# Patient Record
Sex: MALE | Race: WHITE
[De-identification: names, ages, dates, MRNs, and addresses within clinical notes are randomized per-mention and may not be internally consistent; named-entity substitution may affect disease eponyms.]

---

## 2017-04-29 ENCOUNTER — HOSPITAL ENCOUNTER (OUTPATIENT)
Dept: HOSPITAL 17 - NEPE | Age: 57
Setting detail: OBSERVATION
LOS: 3 days | Discharge: HOME | End: 2017-05-02
Attending: HOSPITALIST | Admitting: HOSPITALIST
Payer: OTHER GOVERNMENT

## 2017-04-29 VITALS — OXYGEN SATURATION: 97 % | RESPIRATION RATE: 16 BRPM

## 2017-04-29 VITALS — BODY MASS INDEX: 28.73 KG/M2 | WEIGHT: 189.6 LBS | HEIGHT: 68 IN

## 2017-04-29 VITALS — HEART RATE: 57 BPM

## 2017-04-29 VITALS
HEART RATE: 52 BPM | RESPIRATION RATE: 18 BRPM | SYSTOLIC BLOOD PRESSURE: 135 MMHG | DIASTOLIC BLOOD PRESSURE: 78 MMHG | OXYGEN SATURATION: 98 %

## 2017-04-29 VITALS
OXYGEN SATURATION: 100 % | HEART RATE: 86 BPM | RESPIRATION RATE: 15 BRPM | SYSTOLIC BLOOD PRESSURE: 120 MMHG | DIASTOLIC BLOOD PRESSURE: 69 MMHG | TEMPERATURE: 97.4 F

## 2017-04-29 VITALS
DIASTOLIC BLOOD PRESSURE: 77 MMHG | HEART RATE: 61 BPM | SYSTOLIC BLOOD PRESSURE: 145 MMHG | OXYGEN SATURATION: 97 % | RESPIRATION RATE: 18 BRPM

## 2017-04-29 VITALS — SYSTOLIC BLOOD PRESSURE: 136 MMHG | DIASTOLIC BLOOD PRESSURE: 72 MMHG

## 2017-04-29 DIAGNOSIS — R00.1: ICD-10-CM

## 2017-04-29 DIAGNOSIS — Z23: ICD-10-CM

## 2017-04-29 DIAGNOSIS — F32.9: ICD-10-CM

## 2017-04-29 DIAGNOSIS — E86.0: ICD-10-CM
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
## 2017-04-29 DIAGNOSIS — R55: Primary | ICD-10-CM

## 2017-04-29 DIAGNOSIS — F41.9: ICD-10-CM

## 2017-04-29 DIAGNOSIS — L40.9: ICD-10-CM

## 2017-04-29 DIAGNOSIS — G40.909: ICD-10-CM

## 2017-04-29 DIAGNOSIS — Z86.73: ICD-10-CM

## 2017-04-29 DIAGNOSIS — G43.909: ICD-10-CM

## 2017-04-29 DIAGNOSIS — S09.90XA: ICD-10-CM

## 2017-04-29 DIAGNOSIS — R94.31: ICD-10-CM

## 2017-04-29 LAB
ALP SERPL-CCNC: 49 U/L (ref 45–117)
ALT SERPL-CCNC: 31 U/L (ref 12–78)
ANION GAP SERPL CALC-SCNC: 8 MEQ/L (ref 5–15)
APTT BLD: 27.1 SEC (ref 24.3–30.1)
AST SERPL-CCNC: 20 U/L (ref 15–37)
BASOPHILS # BLD AUTO: 0 TH/MM3 (ref 0–0.2)
BASOPHILS NFR BLD: 0.4 % (ref 0–2)
BILIRUB SERPL-MCNC: 0.5 MG/DL (ref 0.2–1)
BUN SERPL-MCNC: 22 MG/DL (ref 7–18)
CHLORIDE SERPL-SCNC: 104 MEQ/L (ref 98–107)
CK MB SERPL-MCNC: 2.8 NG/ML (ref 0.5–3.6)
CK SERPL-CCNC: 230 U/L (ref 39–308)
EOSINOPHIL # BLD: 0 TH/MM3 (ref 0–0.4)
EOSINOPHIL NFR BLD: 0.4 % (ref 0–4)
ERYTHROCYTE [DISTWIDTH] IN BLOOD BY AUTOMATED COUNT: 14.7 % (ref 11.6–17.2)
GFR SERPLBLD BASED ON 1.73 SQ M-ARVRAT: 85 ML/MIN (ref 89–?)
HCO3 BLD-SCNC: 29.4 MEQ/L (ref 21–32)
HCT VFR BLD CALC: 39.5 % (ref 39–51)
HEMO FLAGS: (no result)
INR PPP: 1 RATIO
LYMPHOCYTES # BLD AUTO: 1.5 TH/MM3 (ref 1–4.8)
LYMPHOCYTES NFR BLD AUTO: 15.5 % (ref 9–44)
MCH RBC QN AUTO: 29.7 PG (ref 27–34)
MCHC RBC AUTO-ENTMCNC: 33.9 % (ref 32–36)
MCV RBC AUTO: 87.7 FL (ref 80–100)
MONOCYTES NFR BLD: 9.4 % (ref 0–8)
NEUTROPHILS # BLD AUTO: 7.1 TH/MM3 (ref 1.8–7.7)
NEUTROPHILS NFR BLD AUTO: 74.3 % (ref 16–70)
PLATELET # BLD: 180 TH/MM3 (ref 150–450)
POTASSIUM SERPL-SCNC: 3.9 MEQ/L (ref 3.5–5.1)
PROTHROMBIN TIME: 10.7 SEC (ref 9.8–11.6)
RBC # BLD AUTO: 4.51 MIL/MM3 (ref 4.5–5.9)
SODIUM SERPL-SCNC: 141 MEQ/L (ref 136–145)
WBC # BLD AUTO: 9.6 TH/MM3 (ref 4–11)

## 2017-04-29 PROCEDURE — 80053 COMPREHEN METABOLIC PANEL: CPT

## 2017-04-29 PROCEDURE — 90732 PPSV23 VACC 2 YRS+ SUBQ/IM: CPT

## 2017-04-29 PROCEDURE — 72125 CT NECK SPINE W/O DYE: CPT

## 2017-04-29 PROCEDURE — 85025 COMPLETE CBC W/AUTO DIFF WBC: CPT

## 2017-04-29 PROCEDURE — 80164 ASSAY DIPROPYLACETIC ACD TOT: CPT

## 2017-04-29 PROCEDURE — 93306 TTE W/DOPPLER COMPLETE: CPT

## 2017-04-29 PROCEDURE — 83880 ASSAY OF NATRIURETIC PEPTIDE: CPT

## 2017-04-29 PROCEDURE — 93880 EXTRACRANIAL BILAT STUDY: CPT

## 2017-04-29 PROCEDURE — 85730 THROMBOPLASTIN TIME PARTIAL: CPT

## 2017-04-29 PROCEDURE — 95819 EEG AWAKE AND ASLEEP: CPT

## 2017-04-29 PROCEDURE — 99285 EMERGENCY DEPT VISIT HI MDM: CPT

## 2017-04-29 PROCEDURE — 82552 ASSAY OF CPK IN BLOOD: CPT

## 2017-04-29 PROCEDURE — 84484 ASSAY OF TROPONIN QUANT: CPT

## 2017-04-29 PROCEDURE — 85610 PROTHROMBIN TIME: CPT

## 2017-04-29 PROCEDURE — 97162 PT EVAL MOD COMPLEX 30 MIN: CPT

## 2017-04-29 PROCEDURE — G0378 HOSPITAL OBSERVATION PER HR: HCPCS

## 2017-04-29 PROCEDURE — 93005 ELECTROCARDIOGRAM TRACING: CPT

## 2017-04-29 PROCEDURE — 70450 CT HEAD/BRAIN W/O DYE: CPT

## 2017-04-29 PROCEDURE — 96374 THER/PROPH/DIAG INJ IV PUSH: CPT

## 2017-04-29 PROCEDURE — 81001 URINALYSIS AUTO W/SCOPE: CPT

## 2017-04-29 PROCEDURE — 82550 ASSAY OF CK (CPK): CPT

## 2017-04-29 PROCEDURE — 71010: CPT

## 2017-04-29 RX ADMIN — QUETIAPINE SCH MG: 150 TABLET, EXTENDED RELEASE ORAL at 23:01

## 2017-04-29 RX ADMIN — Medication SCH ML: at 23:00

## 2017-04-29 RX ADMIN — MORPHINE SULFATE PRN MG: 2 INJECTION, SOLUTION INTRAMUSCULAR; INTRAVENOUS at 20:23

## 2017-04-29 RX ADMIN — BETAMETHASONE DIPROPIONATE SCH APPLIC: 0.5 CREAM TOPICAL at 23:02

## 2017-04-29 RX ADMIN — PHENYTOIN SODIUM SCH MLS/HR: 50 INJECTION INTRAMUSCULAR; INTRAVENOUS at 20:23

## 2017-04-29 NOTE — RADRPT
EXAM DATE/TIME:  04/29/2017 16:32 

 

[facility] COMPARISON:     

No previous studies available for comparison.

 

                     

INDICATIONS :     

Dizzy and lightheaded.

                     

 

MEDICAL HISTORY :     

None.          

 

SURGICAL HISTORY :     

None.   

 

ENCOUNTER:     

Initial                                        

 

ACUITY:     

1 day      

 

PAIN SCORE:     

0/10

 

LOCATION:     

Bilateral chest 

 

FINDINGS:     

The lungs are clear without infiltrate, nodule, or mass except for slight left lung base atelectasis.
  There is no appreciable pleural effusion for technique.  Heart and mediastinum are unremarkable.

 

CONCLUSION:         

Slight left lung base atelectasis.

 

 

 

 GAYLE Garcia MD on April 29, 2017 at 17:27           

Board Certified Radiologist.

 This report was verified electronically.

## 2017-04-29 NOTE — PD
HPI


Chief Complaint:  Syncope/Near-Syncope


Time Seen by Provider:  16:33


Travel History


International Travel<30 days:  No


Contact w/Intl Traveler<30days:  No


Traveled to known affect area:  No





History of Present Illness


HPI


57-year-old male with history of seizure disorder, migraine headaches, CVA, 

presents to the emergency department for evaluation.  Patient states that this 

morning he began feeling dizzy.  He was with a friend.  He became lightheaded 

and passed out.  He struck his head on the ground.  Patient states following 

that he has felt not quite himself throughout the day.  He states that there 

were 2 more instance today where he fell again.  He states he was aware but had 

felt like he had lost control of his body.  He states that this scared him.  He 

has never had anything like this happen before.  He does sometimes fall from 

time to time but he states never like this.  He denies any recent illnesses, 

fever, or chills.  Denies any chest pain or tightness.  Denies any episodes of 

diaphoresis.  No nausea or vomiting.  He is reporting a headache and back pain 

now since striking his head on the ground.  He reports no acute focal deficit 

or weakness.  He has no other symptoms to report at this time.





PFSH


Past Medical History


Arthritis:  Yes


Anxiety:  Yes


Depression:  Yes


Cerebrovascular Accident:  Yes


Diabetes:  No


Musculoskeletal:  Yes (DEGENERATIVE DISC DISEASE, BULGING DISC)


Integumentary:  Yes (PSORIASIS)


Immunizations Current:  Yes





Past Surgical History


Abdominal Surgery:  Yes (HERNIA REPAIR RIGHT LOWER GROIN)


Eye Surgery:  Yes (left eyelid)


Other Surgery:  Yes (SINUS)





Social History


Alcohol Use:  No (3 years ago)


Tobacco Use:  No


Substance Use:  No





Allergies-Medications


(Allergen,Severity, Reaction):  


Coded Allergies:  


     Lomustine (Verified  Allergy, Severe, Rash, 4/29/17)


     Penicillin (Verified  Allergy, Severe, Rash, 4/29/17)


Reported Meds & Prescriptions





Reported Meds & Active Scripts


Active


Reported


Ammonium Lactate (Lactic Acid) 12 % Cre 1 Applic TOP BID


     


     


     APPLY TO:


Clobetasol Topical (Clobetasol Propionate) 0.05% Cream 1 Applic TOPICAL BID


Pyridostigmine ER (Pyridostigmine Bromide) 180 Mg Tab 60 Mg PO TID


Trazodone HCl 100 Mg Tab 200 Mg PO HS


Fluoxetine HCl (Fluoxetine HCl (Pmdd)) 20 Mg Tab 60 Mg PO DAILY


Quetiapine Fumarate ER (Quetiapine Fumarate) 200 Mg Tab 150 Mg PO HS


Hydroxyzine Pamoate 100 Mg Cap 100 Mg PO QID PRN


Divalproex ER (Divalproex Sodium) 500 Mg Tab 1,000 Mg PO DAILY


Zolpidem (Zolpidem Tartrate) 5 Mg Tab 5 Mg PO HS PRN








Review of Systems


Except as stated in HPI:  all other systems reviewed are Neg





Physical Exam


Narrative


GENERAL: Well-nourished male patient, sitting in bed in no acute distress


SKIN: Focused skin assessment warm/dry.


HEAD: Normocephalic. 


EYES: Slightly [place] left pupil.  No scleral icterus. No injection or 

drainage. 


ENT: No nasal bleeding or discharge.  Mucous membranes pink and moist.


NECK: Trachea midline. No JVD.  No cervical spine tenderness palpation.


CARDIOVASCULAR: Regular rate and rhythm.  No murmur appreciated.


RESPIRATORY: No accessory muscle use. Clear to auscultation. Breath sounds 

equal bilaterally. 


GASTROINTESTINAL: Abdomen soft, non-tender, nondistended. Hepatic and splenic 

margins not palpable. 


MUSCULOSKELETAL: No obvious deformities. No clubbing.  No cyanosis.  No edema. 


NEUROLOGICAL: Awake and alert. No obvious cranial nerve deficits.  Motor 

grossly within normal limits. Normal speech.


PSYCHIATRIC: Appropriate mood and affect; insight and judgment normal.





Data


Data


Last Documented VS





Vital Signs








  Date Time  Temp Pulse Resp B/P Pulse Ox O2 Delivery O2 Flow Rate FiO2


 


4/29/17 16:38   16  97 Room Air  


 


4/29/17 16:07 97.4 86  120/69    








Orders





 Electrocardiogram (4/29/17 16:24)


Complete Blood Count With Diff (4/29/17 16:24)


Comprehensive Metabolic Panel (4/29/17 16:24)


B-Type Natriuretic Peptide (4/29/17 16:24)


Ckmb (Isoenzyme) Profile (4/29/17 16:24)


Troponin I (4/29/17 16:24)


Act Partial Throm Time (Ptt) (4/29/17 16:24)


Prothrombin Time / Inr (Pt) (4/29/17 16:24)


Urinalysis - C+S If Indicated (4/29/17 16:24)


Chest, Single Ap (4/29/17 16:24)


Ct Brain W/O Iv Contrast(Rout) (4/29/17 16:24)


Ct Cerv Spine W/O Contrast (4/29/17 16:24)


Ecg Monitoring (4/29/17 16:24)


Iv Access Insert/Monitor (4/29/17 16:24)


Oximetry (4/29/17 16:24)


Sodium Chloride 0.9% Flush (Ns Flush) (4/29/17 16:30)


CKMB (4/29/17 16:58)


CKMB% (4/29/17 16:58)


Sodium Chlor 0.9% 1000 Ml Inj (Ns 1000 M (4/29/17 18:15)


Orthostatic Blood Pressure (4/29/17 18:14)


Valproic Acid (Depakene) (4/29/17 19:07)





Labs








 Laboratory Tests








Test 4/29/17





 16:58


 


White Blood Count 9.6 TH/MM3


 


Red Blood Count 4.51 MIL/MM3


 


Hemoglobin 13.4 GM/DL


 


Hematocrit 39.5 %


 


Mean Corpuscular Volume 87.7 FL


 


Mean Corpuscular Hemoglobin 29.7 PG


 


Mean Corpuscular Hemoglobin 33.9 %





Concent 


 


Red Cell Distribution Width 14.7 %


 


Platelet Count 180 TH/MM3


 


Mean Platelet Volume 7.1 FL


 


Neutrophils (%) (Auto) 74.3 %


 


Lymphocytes (%) (Auto) 15.5 %


 


Monocytes (%) (Auto) 9.4 %


 


Eosinophils (%) (Auto) 0.4 %


 


Basophils (%) (Auto) 0.4 %


 


Neutrophils # (Auto) 7.1 TH/MM3


 


Lymphocytes # (Auto) 1.5 TH/MM3


 


Monocytes # (Auto) 0.9 TH/MM3


 


Eosinophils # (Auto) 0.0 TH/MM3


 


Basophils # (Auto) 0.0 TH/MM3


 


CBC Comment DIFF FINAL 


 


Differential Comment  


 


Prothrombin Time 10.7 SEC


 


Prothromb Time International 1.0 RATIO





Ratio 


 


Activated Partial 27.1 SEC





Thromboplast Time 


 


Sodium Level 141 MEQ/L


 


Potassium Level 3.9 MEQ/L


 


Chloride Level 104 MEQ/L


 


Carbon Dioxide Level 29.4 MEQ/L


 


Anion Gap 8 MEQ/L


 


Blood Urea Nitrogen 22 MG/DL


 


Creatinine 0.92 MG/DL


 


Estimat Glomerular Filtration 85 ML/MIN





Rate 


 


Random Glucose 90 MG/DL


 


Calcium Level 8.8 MG/DL


 


Total Bilirubin 0.5 MG/DL


 


Aspartate Amino Transf 20 U/L





(AST/SGOT) 


 


Alanine Aminotransferase 31 U/L





(ALT/SGPT) 


 


Alkaline Phosphatase 49 U/L


 


Total Creatine Kinase 230 U/L


 


Creatine Kinase MB 2.8 NG/ML


 


Troponin I LESS THAN 0.02





 NG/ML


 


B-Type Natriuretic Peptide 63 PG/ML


 


Total Protein 7.2 GM/DL


 


Albumin 3.8 GM/DL














MDM


Medical Decision Making


Medical Screen Exam Complete:  Yes


Emergency Medical Condition:  Yes


Medical Record Reviewed:  Yes


Differential Diagnosis


Syncope versus near-syncope versus electrolyte abnormality versus dehydration 

versus cardiac etiology versus neurologic etiology versus seizure versus 

Depakote toxicity


Narrative Course


57-year-old male presents to emergency department for evaluation following 3 

syncopal episodes today.  Patient appears without distress.  His vital signs 

are stable.  Neuro exam is nonfocal.  CBC and CMP are without acute concern.  

Troponin is less than 0.02.  BNP is 63.  CT of the cervical spine is a slight 

neural foramina compromise at the right C3-C4, left C5-C6, left C6-C7, and 

lateral recess compromise at C6 7.  CT imaging of the brain is without acute 

intracranial normality.  Chest x-ray shows slight left lung base atelectasis.  

I discussed the patient my attending physician Dr. Quezada who agrees the patient 

should be admitted observation for syncope workup.  This is discussed with the 

patient.  He is in agreement with this clinic care.





Diagnosis





 Primary Impression:  


 Syncope


 Qualified Code:  R55 - Syncope, unspecified syncope type


 Additional Impression:  


 Mild closed head injury


 Qualified Code:  S09.90XA - Mild closed head injury, initial encounter





Admitting Information


Admitting Physician Requests:  Observation


Condition:  Stable








Jaleesa Vinson Apr 29, 2017 16:33

## 2017-04-29 NOTE — HHI.HP
__________________________________________________





hospitals


Service


[facility]


Primary Care Physician


[facility]


Admission Diagnosis


SYNCOPE


Diagnoses:  


(1) Syncope


Diagnosis:  Principal





(2) Seizure disorder


Diagnosis:  Principal





(3) Dehydration


Diagnosis:  Principal





(4) Migraine


Diagnosis:  Principal





Travel History


International Travel<30 Days:  No


Contact w/Intl Traveler <30 Da:  No


Traveled to Known Affected Are:  No


History of Present Illness


This is a 57-year-old male with a PMH of Seizure Disorder, Migraine, Anxiety, 

Depression and Psoriasis who presented to the ER after syncopal event.  Per 

patient he "didn't feel right all-day", was at [employer] with a friend and felt 

sudden onset of lightheadedness/dizziness and sat down on a bench, then had 

syncopal event w/ head trauma after striking head on ground.  Reports 2 

subsequent syncopal events as well.  Denies seizure activity.  On arrival, BP 

120/69, HR 86, O2 sat 100% on RA, Afebrile.  CBC unremarkable except for 

elevated neutrophil count.  Chemistry essentially unremarkable GFR 85, 

previously 1 21-7/13 16.  Troponin negative.  INR 1.0.  CT Head unremarkable.  

CT C-spine slight neural foraminal compromise, no acute findings.  CXR with 

slight left lung base atelectasis.





Review of Systems


Except as stated in HPI:  all other systems reviewed are Neg


ROS: 14 point review of systems otherwise negative.





Past Family Social History


Past Medical History


PMH:  Seizure Disorder, Migraine, Anxiety, Depression and Psoriasis


Past Surgical History


PAST SURGICAL HISTORY:  Hernia Repair, Left Eyelid Surgery, Sinus Surgery


Allergies:  


Coded Allergies:  


     Lomustine (Verified  Allergy, Severe, Rash, 4/29/17)


     Penicillin (Verified  Allergy, Severe, Rash, 4/29/17)


Family History


PAST FAMILY HISTORY:  Reviewed.  No h/o DM or CAD


Social History


PAST SOCIAL HISTORY:  Negative for alcohol, tobacco or drugs.





Physical Exam


Vital Signs





 Vital Signs








  Date Time  Temp Pulse Resp B/P Pulse Ox O2 Delivery O2 Flow Rate FiO2


 


4/29/17 19:12  52 18 135/78 98 Room Air  


 


4/29/17 16:38   16  97 Room Air  


 


4/29/17 16:07 97.4 86 15 120/69 100   








Physical Exam


PE:


GENERAL: Middle-aged male in no acute distress.


HEENT: PERRLA, EOMI. No scleral icterus or conjunctival pallor. No lid lag or 

facial droop.  


CARDIOVASCULAR: Regular rate and rhythm.  No obvious murmurs to auscultation. 

No chest tenderness to palpation. 


RESPIRATORY: No obvious rhonchi or wheezing. Clear to auscultation. Breath 

sounds equal bilaterally. 


GASTROINTESTINAL: Abdomen soft, non-tender, nondistended. BS normal. 


MUSCULOSKELETAL: Extremities without clubbing, cyanosis, or edema. No obvious 

deformities. 


NEUROLOGICAL: Awake, alert and oriented x4. No focal neurologic deficits. 

Moving both upper and lower extremities spontaneously.


Laboratory





Laboratory Tests








Test 4/29/17





 16:58


 


White Blood Count 9.6 


 


Red Blood Count 4.51 


 


Hemoglobin 13.4 


 


Hematocrit 39.5 


 


Mean Corpuscular Volume 87.7 


 


Mean Corpuscular Hemoglobin 29.7 


 


Mean Corpuscular Hemoglobin 33.9 





Concent 


 


Red Cell Distribution Width 14.7 


 


Platelet Count 180 


 


Mean Platelet Volume 7.1 


 


Neutrophils (%) (Auto) 74.3 


 


Lymphocytes (%) (Auto) 15.5 


 


Monocytes (%) (Auto) 9.4 


 


Eosinophils (%) (Auto) 0.4 


 


Basophils (%) (Auto) 0.4 


 


Neutrophils # (Auto) 7.1 


 


Lymphocytes # (Auto) 1.5 


 


Monocytes # (Auto) 0.9 


 


Eosinophils # (Auto) 0.0 


 


Basophils # (Auto) 0.0 


 


CBC Comment DIFF FINAL 


 


Differential Comment  


 


Prothrombin Time 10.7 


 


Prothromb Time International 1.0 





Ratio 


 


Activated Partial 27.1 





Thromboplast Time 


 


Sodium Level 141 


 


Potassium Level 3.9 


 


Chloride Level 104 


 


Carbon Dioxide Level 29.4 


 


Anion Gap 8 


 


Blood Urea Nitrogen 22 


 


Creatinine 0.92 


 


Estimat Glomerular Filtration 85 





Rate 


 


Random Glucose 90 


 


Calcium Level 8.8 


 


Total Bilirubin 0.5 


 


Aspartate Amino Transf 20 





(AST/SGOT) 


 


Alanine Aminotransferase 31 





(ALT/SGPT) 


 


Alkaline Phosphatase 49 


 


Total Creatine Kinase 230 


 


Creatine Kinase MB 2.8 


 


Troponin I LESS THAN 0.02 


 


B-Type Natriuretic Peptide 63 


 


Total Protein 7.2 


 


Albumin 3.8 








Result Diagram:  


4/29/17 1658                                                                   

             4/29/17 5648








Assessment and Plan


Problem List:  


(1) Syncope


ICD Code:  R55


Status:  Acute


(2) Dehydration


ICD Code:  E86.0


Status:  Acute


(3) Seizure disorder


ICD Code:  G40.909


Status:  Acute


(4) Migraine


ICD Code:  G43.909


Status:  Acute


Assessment and Plan


A/P:


1.  Syncope:  s/p syncopal event x3 earlier today w/ +head trauma.  CT Head/C-

Spine w/ no acute findings, images reviewed by me.  Dizziness/lightheadedness 

prior to event.  Admit for Observation, telemetry, IVF for hydration.  Initial 

trop negative, check serial cardiac enzymes.  Check Echo. 


2.  Dehydration:  GFR 85, previously 121 on 7/13/16.  Check U/a, IVF for 

hydration, repeat labs in am. 


3.  Seizure Disorder:  Controlled.  No reported seizure activity.  Resume home 

medications.  Valproic Acid level pending.  


4.  Migraine:  Takes Tylenol at home, analgesics as needed. 


5.  DVT Prophylaxis:  SCD/Teds. 


6.  Social work for d/c planning as needed. 


7.  Case discussed w/ ER physician at length.





Problem Qualifiers





(1) Syncope:  


Qualified Code:  R55 - Syncope, unspecified syncope type





Manisha Gee MD Apr 29, 2017 19:42

## 2017-04-29 NOTE — RADRPT
EXAM DATE/TIME:  04/29/2017 17:53 

 

[facility] COMPARISON:     

No previous studies available for comparison.

 

 

INDICATIONS :     

Fall, hit head on concrete.

                      

 

RADIATION DOSE:     

41.00 CTDIvol (mGy) 

 

 

 

MEDICAL HISTORY :     

Cerebrovascular disease.  

 

SURGICAL HISTORY :      

None. 

 

ENCOUNTER:      

Initial

 

ACUITY:      

1 day

 

PAIN SCALE:      

0/10

 

LOCATION:        

cranial 

 

TECHNIQUE:     

Multiple contiguous axial images were obtained of the head.  Using automated exposure control and adj
ustment of the mA and/or kV according to patient size, radiation dose was kept as low as reasonably a
chievable to obtain optimal diagnostic quality images. 

 

FINDINGS:     

There is no evidence for intracranial hemorrhage, mass effect, mass lesions, edema, or extra-axial fl
uid collections.  The visualized bony structures appear intact.  The ventricles are normal size for t
he patient's age.  There are no signs of acute infarction for technique. 

 

CONCLUSION:          Unremarkable study.

 

 

 

 GAYLE Garcia MD on April 29, 2017 at 18:02           

Board Certified Radiologist.

 This report was verified electronically.

## 2017-04-29 NOTE — RADRPT
EXAM DATE/TIME:  04/29/2017 17:53 

 

[facility] COMPARISON:     

No previous studies available for comparison.

 

 

INDICATIONS :     

Fall, hit head on concrete.

                      

 

RADIATION DOSE:     

20.54 CTDIvol (mGy) 

 

 

 

MEDICAL HISTORY :     

Cerebrovascular disease.  

 

SURGICAL HISTORY :      

None. 

 

ENCOUNTER:      

Initial

 

ACUITY:      

1 day

 

PAIN SCALE:      

0/10

 

LOCATION:        

neck 

 

TECHNIQUE:     

Volumetric scanning of the cervical spine was performed. Multiplanar reconstructions in the sagittal,
 coronal and oblique axial planes were performed.   Using automated exposure control and adjustment o
f the mA and/or kV according to patient size, radiation dose was kept as low as reasonably achievable
 to obtain optimal diagnostic quality images. 

 

FINDINGS:     

     No evidence of subluxation. No definite fracture is seen for technique.

 

C2-C3:  

There is no evidence for any significant compromise to the thecal sac, or the exiting nerve roots.  N
o appreciable thecal sac stenosis is seen.  The neural foramina and lateral recess appear patent bila
terally.

 

C3-C4:  

There is slight neural foramina compromise on the right due to asymmetrical bulging disc and hypertro
phic changes. No significant thecal sac stenosis is seen.

 

C4-C5: 

Slight degenerative changes are seen within the disc space and facets. There is no evidence for any s
ignificant compromise to the thecal sac, or the exiting nerve roots.  No appreciable thecal sac steno
sis is seen.  The neural foramina and lateral recess appear patent bilaterally.

 

C5-C6: 

Moderate degenerative changes are seen within the disc space and facets. There is slight neural stanley
araceli compromise on the left due to asymmetrical bulging disc and hypertrophic changes. Slight bulging 
disc and hypertrophic changes are seen with indentation on the thecal sac and no significant compromi
se to the thecal sac .

 

C6-C7: 

Moderate degenerative changes are seen within the disc space and facets. There is slight neural stanley
araceli compromise on the left due to asymmetrical bulging disc and hypertrophic changes. Slight bulging 
disc and hypertrophic changes are seen with indentation on the thecal sac and no significant compromi
se to the thecal sac. Slight lateral recess compromise is seen on the left due to hypertrophic change
s and bulging disc.

 

C7-T1:  

There is no evidence for any significant compromise to the thecal sac, or the exiting nerve roots.  N
o appreciable thecal sac stenosis is seen.  The neural foramina and lateral recess appear patent bila
terally.

 

CONCLUSION:     

Slight neural foramina compromise right C3-C4, left C5-C6, left C6-C7 and lateral recess compromise l
eft C6-7.

 

 

 

 K. Rickey Garcia MD on April 29, 2017 at 18:07           

Board Certified Radiologist.

 This report was verified electronically.

## 2017-04-30 VITALS
TEMPERATURE: 97.9 F | DIASTOLIC BLOOD PRESSURE: 75 MMHG | OXYGEN SATURATION: 96 % | SYSTOLIC BLOOD PRESSURE: 126 MMHG | HEART RATE: 50 BPM | RESPIRATION RATE: 18 BRPM

## 2017-04-30 VITALS
HEART RATE: 60 BPM | TEMPERATURE: 97.6 F | OXYGEN SATURATION: 95 % | DIASTOLIC BLOOD PRESSURE: 64 MMHG | SYSTOLIC BLOOD PRESSURE: 115 MMHG | RESPIRATION RATE: 21 BRPM

## 2017-04-30 VITALS — HEART RATE: 56 BPM

## 2017-04-30 VITALS
RESPIRATION RATE: 20 BRPM | SYSTOLIC BLOOD PRESSURE: 129 MMHG | HEART RATE: 49 BPM | OXYGEN SATURATION: 96 % | TEMPERATURE: 97.5 F | DIASTOLIC BLOOD PRESSURE: 74 MMHG

## 2017-04-30 VITALS
TEMPERATURE: 97.9 F | HEART RATE: 56 BPM | RESPIRATION RATE: 18 BRPM | DIASTOLIC BLOOD PRESSURE: 55 MMHG | OXYGEN SATURATION: 96 % | SYSTOLIC BLOOD PRESSURE: 116 MMHG

## 2017-04-30 VITALS
RESPIRATION RATE: 18 BRPM | TEMPERATURE: 98.6 F | HEART RATE: 56 BPM | DIASTOLIC BLOOD PRESSURE: 67 MMHG | OXYGEN SATURATION: 95 % | SYSTOLIC BLOOD PRESSURE: 111 MMHG

## 2017-04-30 LAB
ALP SERPL-CCNC: 48 U/L (ref 45–117)
ALT SERPL-CCNC: 26 U/L (ref 12–78)
ANION GAP SERPL CALC-SCNC: 4 MEQ/L (ref 5–15)
AST SERPL-CCNC: 19 U/L (ref 15–37)
BACTERIA #/AREA URNS HPF: (no result) /HPF
BASOPHILS # BLD AUTO: 0 TH/MM3 (ref 0–0.2)
BASOPHILS NFR BLD: 0.4 % (ref 0–2)
BILIRUB SERPL-MCNC: 0.4 MG/DL (ref 0.2–1)
BUN SERPL-MCNC: 16 MG/DL (ref 7–18)
CHLORIDE SERPL-SCNC: 111 MEQ/L (ref 98–107)
COLOR UR: YELLOW
COMMENT (UR): (no result)
CULTURE IF INDICATED: (no result)
EOSINOPHIL # BLD: 0.1 TH/MM3 (ref 0–0.4)
EOSINOPHIL NFR BLD: 2.2 % (ref 0–4)
ERYTHROCYTE [DISTWIDTH] IN BLOOD BY AUTOMATED COUNT: 15 % (ref 11.6–17.2)
GFR SERPLBLD BASED ON 1.73 SQ M-ARVRAT: 109 ML/MIN (ref 89–?)
GLUCOSE UR STRIP-MCNC: (no result) MG/DL
HCO3 BLD-SCNC: 29.6 MEQ/L (ref 21–32)
HCT VFR BLD CALC: 37.5 % (ref 39–51)
HEMO FLAGS: (no result)
HGB UR QL STRIP: (no result)
KETONES UR STRIP-MCNC: (no result) MG/DL
LYMPHOCYTES # BLD AUTO: 2 TH/MM3 (ref 1–4.8)
LYMPHOCYTES NFR BLD AUTO: 30.5 % (ref 9–44)
MCH RBC QN AUTO: 30.5 PG (ref 27–34)
MCHC RBC AUTO-ENTMCNC: 35.1 % (ref 32–36)
MCV RBC AUTO: 87 FL (ref 80–100)
MONOCYTES NFR BLD: 8.3 % (ref 0–8)
MUCOUS THREADS #/AREA URNS LPF: (no result) /LPF
NEUTROPHILS # BLD AUTO: 3.9 TH/MM3 (ref 1.8–7.7)
NEUTROPHILS NFR BLD AUTO: 58.6 % (ref 16–70)
NITRITE UR QL STRIP: (no result)
PLATELET # BLD: 168 TH/MM3 (ref 150–450)
POTASSIUM SERPL-SCNC: 3.7 MEQ/L (ref 3.5–5.1)
RBC # BLD AUTO: 4.31 MIL/MM3 (ref 4.5–5.9)
SODIUM SERPL-SCNC: 145 MEQ/L (ref 136–145)
SP GR UR STRIP: 1.01 (ref 1–1.03)
WBC # BLD AUTO: 6.6 TH/MM3 (ref 4–11)

## 2017-04-30 RX ADMIN — PYRIDOSTIGMINE BROMIDE SCH MG: 60 TABLET ORAL at 13:00

## 2017-04-30 RX ADMIN — MORPHINE SULFATE PRN MG: 2 INJECTION, SOLUTION INTRAMUSCULAR; INTRAVENOUS at 15:39

## 2017-04-30 RX ADMIN — PHENYTOIN SODIUM SCH MLS/HR: 50 INJECTION INTRAMUSCULAR; INTRAVENOUS at 15:36

## 2017-04-30 RX ADMIN — PYRIDOSTIGMINE BROMIDE SCH MG: 60 TABLET ORAL at 17:43

## 2017-04-30 RX ADMIN — DIVALPROEX SODIUM SCH MG: 500 TABLET, EXTENDED RELEASE ORAL at 09:50

## 2017-04-30 RX ADMIN — QUETIAPINE SCH MG: 150 TABLET, EXTENDED RELEASE ORAL at 20:48

## 2017-04-30 RX ADMIN — QUETIAPINE SCH MG: 150 TABLET, EXTENDED RELEASE ORAL at 20:54

## 2017-04-30 RX ADMIN — Medication SCH ML: at 20:47

## 2017-04-30 RX ADMIN — BETAMETHASONE DIPROPIONATE SCH APPLIC: 0.5 CREAM TOPICAL at 09:50

## 2017-04-30 RX ADMIN — PYRIDOSTIGMINE BROMIDE SCH MG: 60 TABLET ORAL at 09:00

## 2017-04-30 RX ADMIN — PHENYTOIN SODIUM SCH MLS/HR: 50 INJECTION INTRAMUSCULAR; INTRAVENOUS at 06:04

## 2017-04-30 RX ADMIN — MORPHINE SULFATE PRN MG: 2 INJECTION, SOLUTION INTRAMUSCULAR; INTRAVENOUS at 20:50

## 2017-04-30 RX ADMIN — BETAMETHASONE DIPROPIONATE SCH APPLIC: 0.5 CREAM TOPICAL at 20:48

## 2017-04-30 RX ADMIN — PYRIDOSTIGMINE BROMIDE SCH MG: 60 TABLET ORAL at 09:58

## 2017-04-30 RX ADMIN — Medication SCH ML: at 09:50

## 2017-04-30 RX ADMIN — MORPHINE SULFATE PRN MG: 2 INJECTION, SOLUTION INTRAMUSCULAR; INTRAVENOUS at 06:40

## 2017-04-30 NOTE — HHI.PR
Subjective


Remarks


Follow up for syncope.  The patient reports yesterday he was at [facility] with a 

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  He 

states he felt fine throughout the day, drank plenty of water, felt cool and 

the shade, when he all of a sudden felt very lightheaded, tried to walk forward

, but then felt himself falling backwards, and fell backwards over a bench 

striking the back of his head on the concrete.  He was able to get up with 

assistance, went inside [facility] and sat down on a bench when he felt 

lightheaded and passed out again.  He started feeling better and therefore his 

fiance took him home.  At home his blood pressure was in the 70s/50s therefore 

his fiance urged him to come to the emergency room.  He is not on blood 

pressure medications. The patient states today he feels slightly better, no 

lightheadedness/dizziness, but does feel sore "all over" mostly in his back 

from falling over the bench.  The patient states he does have a history of 

getting lightheaded mostly upon standing, then will take a few seconds to go 

away.  He has never passed out like this before.  He denies any recent vomiting 

or diarrhea.  He states he stays well hydrated and drinks water regularly.  

Denies any recent chest pains or shortness of breath.  Denies any other medical 

complaints at this time.





Objective


Vitals





 Vital Signs








  Date Time  Temp Pulse Resp B/P Pulse Ox O2 Delivery O2 Flow Rate FiO2


 


4/30/17 07:52 97.9 50 18 126/75 96   


 


4/30/17 06:50   20     


 


4/30/17 04:00 97.5 49 20 129/74 96   


 


4/29/17 23:19  57      


 


4/29/17 23:00    125/77    





    136/75    





    128/72    


 


4/29/17 20:24  61 18 145/77 97 Room Air  


 


4/29/17 19:12  52 18 135/78 98 Room Air  


 


4/29/17 16:38   16  97 Room Air  


 


4/29/17 16:07 97.4 86 15 120/69 100   








 I/O








 4/29/17 4/29/17 4/29/17 4/30/17 4/30/17 4/30/17





 07:00 15:00 23:00 07:00 15:00 23:00


 


Intake Total    1125 ml 680 ml 


 


Output Total    500 ml 460 ml 


 


Balance    625 ml 220 ml 


 


      


 


Intake Oral    200 ml 360 ml 


 


IV Total    925 ml 320 ml 


 


Output Urine Total    500 ml 460 ml 


 


# Bowel Movements    0  








Result Diagram:  


4/30/17 0630                                                                   

             4/30/17 0630





Imaging





Last Impressions








Head CT 4/29/17 1624 Signed





Impressions: 





 Service Date/Time:  Saturday, April 29, 2017 17:53 - CONCLUSION: Unremarkable 





 study.     GAYLE Garcia MD 


 


Chest X-Ray 4/29/17 1624 Signed





Impressions: 





 Service Date/Time:  Saturday, April 29, 2017 16:32 - CONCLUSION:  Slight left 





 lung base atelectasis.     GAYLE Garcia MD 


 


Cervical Spine CT 4/29/17 1624 Signed





Impressions: 





 Service Date/Time:  Saturday, April 29, 2017 17:53 - CONCLUSION:  Slight 

neural 





 foramina compromise right C3-C4, left C5-C6, left C6-C7 and lateral recess 





 compromise left C6-7.     GAYLE Garcia MD 








Objective Remarks


GENERAL: Well-nourished, well-developed middle aged male patient in [facility].


SKIN: Warm and dry. No rash.


HEENT:  Normocephalic. Atraumatic. Pupils equal and round. Mucous membranes 

pink and moist.


NECK: Supple. Trachea midline.  


CARDIOVASCULAR: Regular rate and rhythm.  S1, S2 noted. No murmur appreciated. 


RESPIRATORY: No accessory muscle use. Clear to auscultation. Breath sounds 

equal bilaterally.  


GASTROINTESTINAL: Abdomen soft, non-tender, nondistended. Normoactive bowel 

sounds x4.


MUSCULOSKELETAL: No obvious deformities. Extremities without clubbing, cyanosis

, or edema. 


NEUROLOGICAL: Awake and alert. No obvious cranial nerve deficits.  Motor 

grossly within normal limits. 5/5 muscle strength in bilateral upper and lower 

extremities.  Normal speech.


PSYCHIATRIC: Appropriate mood and affect; insight and judgment normal.


Medications and IVs





 Current Medications








 Medications


  (Trade)  Dose


 Ordered  Sig/Irlanda


 Route  Start Time


 Stop Time Status Last Admin


 


 Sodium Chloride 2


 ml  2 ml  UNSCH  PRN


 IVF  4/29/17 16:30


     


 


 


  (NS 1000 ml Inj)  1,000 ml @ 


 100 mls/hr  Q10H


 IV  4/29/17 19:36


    4/30/17 06:04


 


 


  (NS Flush)  2 ml  UNSCH  PRN


 IV FLUSH  4/29/17 19:45


     


 


 


  (NS Flush)  2 ml  BID


 IV FLUSH  4/29/17 21:00


    4/30/17 09:50


 


 


  (Zofran Inj)  4 mg  Q6H  PRN


 IVP  4/29/17 19:45


     


 


 


  (Dulcolax Supp)  10 mg  DAILY  PRN


 RECTAL  4/29/17 19:45


     


 


 


  (Tylenol)  650 mg  Q6H  PRN


 PO  4/29/17 19:45


     


 


 


  (Norco  5-325 Mg)  1 tab  Q4H  PRN


 PO  4/29/17 19:45


     


 


 


  (Morphine Inj)  2 mg  Q3H  PRN


 IV  4/29/17 19:45


    4/30/17 06:40


 


 


  (Depakote Er)  1,000 mg  DAILY


 PO  4/30/17 09:00


    4/30/17 09:50


 


 


  (Ambien)  5 mg  HS  PRN


 PO  4/29/17 19:45


     


 


 


  (Diprosone 0.05%


 Cream)  1 applic  BID


 TOPICAL  4/29/17 21:00


    4/30/17 09:50


 


 


  (PROzac)  60 mg  DAILY


 PO  4/30/17 09:00


    4/30/17 09:49


 


 


  (Mestinon)  60 mg  TID


 PO  4/30/17 09:00


    4/30/17 09:58


 


 


  (SEROquel XR)  150 mg  HS


 PO  4/29/17 21:00


    4/29/17 23:01


 


 


  (Desyrel)  200 mg  HS


 PO  4/29/17 21:00


    4/29/17 23:00


 


 


  (Pneumovax-23


 Inj)  25 mcg  ONCE ONCE


 IM  5/1/17 10:00


 5/1/17 10:01   


 











A/P


Problem List:  


(1) Syncope


ICD Code:  R55


Status:  Acute


(2) Dehydration


ICD Code:  E86.0


Status:  Acute


(3) Seizure disorder


ICD Code:  G40.909


Status:  Acute


(4) Migraine


ICD Code:  G43.909


Status:  Acute


Assessment and Plan


57-year-old male with a PMH of Seizure Disorder, Migraine, Anxiety, Depression 

and Psoriasis who presented to the ER after syncopal event.  





Syncope:  s/p syncopal event x2 w/ +head trauma.  CT Head/C-Spine w/ no acute 

findings, images reviewed by me.  Lightheadedness prior to event.  Monitor on 

telemetry. Give IVF for hydration. ACS ruled out with negative serial cardiac 

enzymes x3 and EKG without acute ischemic changes. Orthostatics negative. Check 

Echo. Check carotid U/S.  With hx of seizures, check EEG. 





Dehydration:  GFR 85, previously 121 on 7/13/16.  Check U/a. Give IVF for 

hydration, repeat labs show improvement, .





Acute Back Pain: secondary to fall over bench after syncope. Continue Norco 

prn. Added flexeril prn spasms. Heating pad. PT consult in am. 





Seizure Disorder:  Controlled.  No reported seizure activity.  Resume home 

medications.  Valproic Acid level 49. 





Migraine:  Takes Tylenol at home, analgesics as needed. 





DVT Prophylaxis:  SCD/Teds. 





Discussed with Dr. Fuller.





Problem Qualifiers





(1) Syncope:  


Qualified Code:  R55 - Syncope, unspecified syncope type





Jenna Butler PA-C Apr 30, 2017 10:54 am

## 2017-04-30 NOTE — RADRPT
EXAM DATE/TIME:  04/30/2017 10:25 

 

[facility] COMPARISON:     

No previous studies available for comparison.

        

 

 

INDICATIONS :     

Syncope. 

                     

 

MEDICAL HISTORY :     

Arthritis.     CVA. Degenerative disc disease. Bulging disc. Psoriasis. Depression. Anxiety. Previous
 suicide attempt. 

 

SURGICAL HISTORY :     

Inguinal hernia repair.     Left eyelid. Sinus. 

 

ENCOUNTER:     

Initial

 

ACUITY:     

1 day

 

PAIN SCORE:     

1/10

 

LOCATION:     

Bilateral neck 

                     

PEAK SYSTOLIC VELOCITIES (cm/sec):

 

ICA/CCA RATIO:                    

Right: 1.0     Left: 1.0

 

ICA:                          

Right: 69     Left: 75

 

CCA:                          

Right: 66     Left: 74

 

ECA:                           

Right: 53     Left: 58

 

VERTEBRAL:           

Right: 37 antegrade     Left: 48 antegrade

             

Elevated flow velocities and ICA/CCA ratios have been found to correlate with increased degrees of

vessel stenosis, calculated as percentage of diameter relative to a normal segment of distal ICA/CCA

 

FINDINGS:     

 

RIGHT CAROTID:     

No significant stenosis is visualized.  The waveforms are within normal limits.

 

LEFT CAROTID:     

No significant stenosis is visualized.  The waveforms are within normal limits.

 

VERTEBRAL ARTERIES:  

Antegrade flow is seen in both vertebral arteries.

 

MISCELLANEOUS:     

None.

 

CONCLUSION:     

Carotid ultrasound within normal limits.

 

 

 

 Hany Machado MD on April 30, 2017 at 12:28           

Board Certified Radiologist.

 This report was verified electronically.

## 2017-04-30 NOTE — MG
cc:

SAVI ROA M.D.

****

 

 

Lab No:  Date:  4/30/2017  Age:   Sex:  M Race:

 

Cc

 

TEST NUMBER



 

TECHNIQUE

17 channel EEG.

 

DESCRIPTION

The background rhythm is a symmetrical alpha rhythm, frequency 8  Hz. Amplitude

20-30 microvolts.  During drowsiness there is mild slowing in the theta range

at roughly 6 Hz.  There are no lateralizing features.  There are no

epileptiform discharges.  Hyperventilation was not done.  Photic stimulation

results in a normal driving response.

 

INTERPRETATION

Normal EEG.

 

                              _________________________________

                              MD XANDER Yousif/ISIDRO

D:  4/30/2017/3:48 PM

T:  4/30/2017/3:53 PM

Visit #:  [id number]

Job #:  [id number]

## 2017-05-01 VITALS — HEART RATE: 60 BPM

## 2017-05-01 VITALS
OXYGEN SATURATION: 95 % | DIASTOLIC BLOOD PRESSURE: 70 MMHG | TEMPERATURE: 96.7 F | HEART RATE: 59 BPM | SYSTOLIC BLOOD PRESSURE: 116 MMHG | RESPIRATION RATE: 16 BRPM

## 2017-05-01 VITALS
TEMPERATURE: 98.2 F | HEART RATE: 51 BPM | RESPIRATION RATE: 18 BRPM | OXYGEN SATURATION: 97 % | SYSTOLIC BLOOD PRESSURE: 146 MMHG | DIASTOLIC BLOOD PRESSURE: 89 MMHG

## 2017-05-01 VITALS
OXYGEN SATURATION: 95 % | RESPIRATION RATE: 21 BRPM | DIASTOLIC BLOOD PRESSURE: 58 MMHG | TEMPERATURE: 98.6 F | HEART RATE: 54 BPM | SYSTOLIC BLOOD PRESSURE: 93 MMHG

## 2017-05-01 VITALS
TEMPERATURE: 97.6 F | HEART RATE: 56 BPM | DIASTOLIC BLOOD PRESSURE: 79 MMHG | SYSTOLIC BLOOD PRESSURE: 134 MMHG | OXYGEN SATURATION: 96 % | RESPIRATION RATE: 18 BRPM

## 2017-05-01 VITALS
RESPIRATION RATE: 18 BRPM | HEART RATE: 61 BPM | SYSTOLIC BLOOD PRESSURE: 126 MMHG | TEMPERATURE: 97.8 F | DIASTOLIC BLOOD PRESSURE: 76 MMHG | OXYGEN SATURATION: 95 %

## 2017-05-01 VITALS
HEART RATE: 59 BPM | OXYGEN SATURATION: 95 % | SYSTOLIC BLOOD PRESSURE: 119 MMHG | TEMPERATURE: 97.6 F | RESPIRATION RATE: 20 BRPM | DIASTOLIC BLOOD PRESSURE: 65 MMHG

## 2017-05-01 RX ADMIN — Medication SCH ML: at 20:34

## 2017-05-01 RX ADMIN — CYCLOBENZAPRINE HYDROCHLORIDE PRN MG: 10 TABLET, FILM COATED ORAL at 10:28

## 2017-05-01 RX ADMIN — PHENYTOIN SODIUM SCH MLS/HR: 50 INJECTION INTRAMUSCULAR; INTRAVENOUS at 01:58

## 2017-05-01 RX ADMIN — PYRIDOSTIGMINE BROMIDE SCH MG: 60 TABLET ORAL at 08:30

## 2017-05-01 RX ADMIN — BETAMETHASONE DIPROPIONATE SCH APPLIC: 0.5 CREAM TOPICAL at 08:32

## 2017-05-01 RX ADMIN — QUETIAPINE SCH MG: 150 TABLET, EXTENDED RELEASE ORAL at 20:32

## 2017-05-01 RX ADMIN — DIVALPROEX SODIUM SCH MG: 500 TABLET, EXTENDED RELEASE ORAL at 08:30

## 2017-05-01 RX ADMIN — Medication SCH ML: at 08:33

## 2017-05-01 RX ADMIN — BETAMETHASONE DIPROPIONATE SCH APPLIC: 0.5 CREAM TOPICAL at 20:34

## 2017-05-01 RX ADMIN — PHENYTOIN SODIUM SCH MLS/HR: 50 INJECTION INTRAMUSCULAR; INTRAVENOUS at 11:36

## 2017-05-01 RX ADMIN — PYRIDOSTIGMINE BROMIDE SCH MG: 60 TABLET ORAL at 12:51

## 2017-05-01 RX ADMIN — CYCLOBENZAPRINE HYDROCHLORIDE PRN MG: 10 TABLET, FILM COATED ORAL at 20:33

## 2017-05-01 RX ADMIN — HYDROCODONE BITARTRATE AND ACETAMINOPHEN PRN TAB: 5; 325 TABLET ORAL at 06:33

## 2017-05-01 RX ADMIN — PYRIDOSTIGMINE BROMIDE SCH MG: 60 TABLET ORAL at 17:11

## 2017-05-01 RX ADMIN — HYDROCODONE BITARTRATE AND ACETAMINOPHEN PRN TAB: 5; 325 TABLET ORAL at 20:32

## 2017-05-01 RX ADMIN — MORPHINE SULFATE PRN MG: 2 INJECTION, SOLUTION INTRAMUSCULAR; INTRAVENOUS at 12:52

## 2017-05-01 NOTE — EC
Study

 

Study Date:05/01/2017

 

 

 

STUDY CONCLUSIONS

 

SUMMARY

 

- Left ventricle: The cavity size was normal. Wall thickness was

normal. Systolic function was normal. The estimated ejection

fraction was 60%. Wall motion was normal; there were no regional

wall motion abnormalities.

- Mitral valve: Mild regurgitation.

- Right ventricle: The cavity size was mildly dilated. Wall

thickness was normal.

- Tricuspid valve: Mild regurgitation.

 

-------------------------------------------------------------------

If LV function is below 40, please consider prescribing an ACEI or

ARB or document rationale for non-use.

 

-------------------------------------------------------------------

PROCEDURE DATA

 

STUDY STATUS:

Elective. Procedure: Transthoracic echocardiography.

Image quality was good. Scanning was performed from the

parasternal, apical, and subcostal acoustic windows. Study

completion: The patient tolerated the procedure well.

Transthoracic echocardiography. M-mode, complete 2D, complete

spectral Doppler, and color Doppler. Patient status: Inpatient.

 

-------------------------------------------------------------------

CARDIAC ANATOMY

 

LEFT VENTRICLE:

The cavity size was normal. Wall thickness was

normal. Systolic function was normal. The estimated ejection

fraction was 60%. Wall motion was normal; there were no regional

wall motion abnormalities.

 

AORTIC VALVE:

Trileaflet; mildly thickened leaflets. Doppler:

Transvalvular velocity was within the normal range. There was no

stenosis. No regurgitation.

 

AORTA:

Aortic root: The aortic root was normal in size.

 

MITRAL VALVE:

Structurally normal valve. Doppler: Transvalvular

velocity was within the normal range. There was no evidence for

stenosis. Mild regurgitation.

 

LEFT ATRIUM:

The atrium was normal in size.

 

RIGHT VENTRICLE:

The cavity size was mildly dilated. Wall thickness

was normal.

 

PULMONIC VALVE:

Doppler: Transvalvular velocity was within the

normal range. There was no evidence for stenosis. No regurgitation.

 

TRICUSPID VALVE:

Structurally normal valve. Doppler: Transvalvular

velocity was within the normal range. Mild regurgitation.

 

PULMONARY ARTERY:

The main pulmonary artery was normal-sized.

Systolic pressure was within the normal range.

 

RIGHT ATRIUM:

The atrium was normal in size.

 

PERICARDIUM:

There was no pericardial effusion.

 

SYSTEMIC VEINS:

Inferior vena cava: The vessel was normal in size.

 

-------------------------------------------------------------------

 

BASIC MEASUREMENTS                                      ADULT

Normal

Left ventricle

LV internal dimension, ED, chordal level,   *39.4 mm    43-52

PLAX

LV internal dimension, ES, chordal level,    27.3 mm    23-38

PLAX

Fractional shortening, chordal level, PLAX     31 %     >29

LV posterior wall thickness, ED                13 mm    -----------

IVS/LVPW ratio, ED                           0.89       <1.3

Ventricular septum

Septal thickness, ED                         11.6 mm    -----------

Aortic valve

Leaflet separation                             18 mm    15-26

Right ventricle

RV internal dimension, ED, PLAX              31.7 mm    19-38

 

BASIC MEASUREMENTS                                      ADULT

Normal

Aortic valve

Leaflet separation                             18 mm    15-26

Aorta

Root diameter, ED                              37 mm    20-37

Left atrium

Anterior-posterior dimension, ES               35 mm    19-40

LA/aortic root ratio                         0.95       -----------

 

DOPPLER MEASUREMENTS                                    ADULT

Normal

Main pulmonary artery

Pressure, S                                    30 mm Hg =30

Tricuspid valve

Regurgitant peak velocity                     226 cm/s  -----------

Peak RV-RA gradient, S                         20 mm Hg -----------

Systemic veins

Estimated CVP                                  10 mm Hg -----------

Right ventricle

RV pressure, S                                *30 mm Hg <30

 

LEGEND:

Mean values are shown as u=mean value.

Asterisk (*) marks values outside specified normal range.

Prepared and signed by

 

Thiago Hodges

[id number]

## 2017-05-01 NOTE — HHI.PR
Subjective


Remarks


Follow up for syncope. The patient reports feeling better today, just "sore" 

all over from his fall. He states the Norco and Flexeril does help. He denies 

any lightheadedness or dizziness. He was able to ambulate with physical therapy 

today, recommends wheeled walker and [facility]. The patient has 

no other medical complaints to report at this time. Awaiting echocardiogram.





Objective


Vitals





 Vital Signs








  Date Time  Temp Pulse Resp B/P Pulse Ox O2 Delivery O2 Flow Rate FiO2


 


5/1/17 07:53 98.2 51 18 146/89 97   


 


5/1/17 07:33   18     


 


5/1/17 04:53 98.6 54 21 93/58 95   


 


5/1/17 00:48 97.6 59 20 119/65 95   


 


4/30/17 20:55   20     


 


4/30/17 20:05  56      


 


4/30/17 19:28 97.6 60 21 115/64 95   


 


4/30/17 16:02 97.9 56 18 116/55 96   


 


4/30/17 12:04 98.6 56 18 111/67 95   








 I/O








 4/30/17 4/30/17 4/30/17 5/1/17 5/1/17 5/1/17





 07:00 15:00 23:00 07:00 15:00 23:00


 


Intake Total 1125 ml 680 ml  1108 ml  


 


Output Total 500 ml 460 ml  800 ml  


 


Balance 625 ml 220 ml  308 ml  


 


      


 


Intake Oral 200 ml 360 ml  400 ml  


 


IV Total 925 ml 320 ml  708 ml  


 


Output Urine Total 500 ml 460 ml  800 ml  


 


# Bowel Movements 0     








Result Diagram:  


4/30/17 0630                                                                   

             4/30/17 0630





Imaging





Last Impressions








Carotid Artery Ultrasound 4/30/17 0000 Signed





Impressions: 





 Service Date/Time:  Sunday, April 30, 2017 10:25 - CONCLUSION:  Carotid 





 ultrasound within normal limits.     Hany Machado MD 


 


Head CT 4/29/17 1624 Signed





Impressions: 





 Service Date/Time:  Saturday, April 29, 2017 17:53 - CONCLUSION: Unremarkable 





 study.     GAYLE Garcia MD 


 


Chest X-Ray 4/29/17 1624 Signed





Impressions: 





 Service Date/Time:  Saturday, April 29, 2017 16:32 - CONCLUSION:  Slight left 





 lung base atelectasis.     GAYLE Garcia MD 


 


Cervical Spine CT 4/29/17 1624 Signed





Impressions: 





 Service Date/Time:  Saturday, April 29, 2017 17:53 - CONCLUSION:  Slight 

neural 





 foramina compromise right C3-C4, left C5-C6, left C6-C7 and lateral recess 





 compromise left C6-7.     GAYLE Garcia MD 








Objective Remarks


GENERAL: Well-nourished, well-developed middle aged male patient in [facility].


SKIN: Warm and dry. No rash.


HEENT:  Normocephalic. Atraumatic. Pupils equal and round. Mucous membranes 

pink and moist.


NECK: Supple. Trachea midline.  


CARDIOVASCULAR: Regular rate and rhythm.  S1, S2 noted. No murmur appreciated. 


RESPIRATORY: No accessory muscle use. Clear to auscultation. Breath sounds 

equal bilaterally.  


GASTROINTESTINAL: Abdomen soft, non-tender, nondistended. Normoactive bowel 

sounds x4.


MUSCULOSKELETAL: No obvious deformities. Extremities without clubbing, cyanosis

, or edema. Multiple areas of ecchymosis throughout mid-lower back, no bony 

point tenderness to palpation. 


NEUROLOGICAL: Awake and alert. No obvious cranial nerve deficits.  Motor 

grossly within normal limits. 5/5 muscle strength in bilateral upper and lower 

extremities.  Normal speech.


PSYCHIATRIC: Appropriate mood and affect; insight and judgment normal.


Medications and IVs





 Current Medications








 Medications


  (Trade)  Dose


 Ordered  Sig/Irlanda


 Route  Start Time


 Stop Time Status Last Admin


 


 Sodium Chloride 2


 ml  2 ml  UNSCH  PRN


 IVF  4/29/17 16:30


     


 


 


  (NS 1000 ml Inj)  1,000 ml @ 


 100 mls/hr  Q10H


 IV  4/29/17 19:36


    5/1/17 01:58


 


 


  (NS Flush)  2 ml  UNSCH  PRN


 IV FLUSH  4/29/17 19:45


     


 


 


  (NS Flush)  2 ml  BID


 IV FLUSH  4/29/17 21:00


    5/1/17 08:33


 


 


  (Zofran Inj)  4 mg  Q6H  PRN


 IVP  4/29/17 19:45


     


 


 


  (Dulcolax Supp)  10 mg  DAILY  PRN


 RECTAL  4/29/17 19:45


     


 


 


  (Tylenol)  650 mg  Q6H  PRN


 PO  4/29/17 19:45


     


 


 


  (Norco  5-325 Mg)  1 tab  Q4H  PRN


 PO  4/29/17 19:45


    5/1/17 06:33


 


 


  (Morphine Inj)  2 mg  Q3H  PRN


 IV  4/29/17 19:45


    4/30/17 20:50


 


 


  (Depakote Er)  1,000 mg  DAILY


 PO  4/30/17 09:00


    5/1/17 08:30


 


 


  (Ambien)  5 mg  HS  PRN


 PO  4/29/17 19:45


     


 


 


  (Diprosone 0.05%


 Cream)  1 applic  BID


 TOPICAL  4/29/17 21:00


    5/1/17 08:32


 


 


  (PROzac)  60 mg  DAILY


 PO  4/30/17 09:00


    5/1/17 08:30


 


 


  (Mestinon)  60 mg  TID


 PO  4/30/17 09:00


    5/1/17 08:30


 


 


  (SEROquel XR)  150 mg  HS


 PO  4/29/17 21:00


    4/30/17 20:54


 


 


  (Desyrel)  200 mg  HS


 PO  4/29/17 21:00


    4/30/17 20:47


 


 


  (Pneumovax-23


 Inj)  25 mcg  ONCE ONCE


 IM  5/1/17 10:00


 5/1/17 10:01   


 


 


  (Flexeril)  5 mg  Q8H  PRN


 PO  4/30/17 19:00


     


 


 


  (Pill Splitter)  1 ea  UNSCH  PRN


 OTHER  4/30/17 10:45


     


 











A/P


Problem List:  


(1) Syncope


ICD Code:  R55


Status:  Acute


(2) Dehydration


ICD Code:  E86.0


Status:  Acute


(3) Seizure disorder


ICD Code:  G40.909


Status:  Acute


(4) Migraine


ICD Code:  G43.909


Status:  Acute


Assessment and Plan


57-year-old male with a PMH of Seizure Disorder, Migraine, Anxiety, Depression 

and Psoriasis who presented to the ER after syncopal event.  





Syncope:  s/p syncopal event x2 w/ +head trauma.  CT Head/C-Spine w/ no acute 

findings, images reviewed by me.  Lightheadedness prior to event.  Monitor on 

telemetry. Give IVF for hydration. ACS ruled out with negative serial cardiac 

enzymes x3 and EKG without acute ischemic changes. Orthostatics negative. 

Carotid U/S unremarkable.  With hx of seizures, checked EEG which was normal. 

Monitor on telemetry, no acute findings. Awaiting Echo. 





Dehydration:  GFR 85, previously 121 on 7/13/16.  Check U/a. Give IVF for 

hydration, repeat labs show improvement, .





Acute Back Pain: secondary to fall over bench after syncope. Continue Norco 

prn. Added flexeril prn spasms. Heating pad. PT consulted, recommends walker 

[facility]. 





Seizure Disorder:  Controlled.  No reported seizure activity.  Resume home 

medications.  Valproic Acid level 49. EEG negative. 





Migraine:  Takes Tylenol at home, analgesics as needed. 





DVT Prophylaxis:  SCD/Teds. 





Discussed with Dr. Fuller and physical therapy.





Problem Qualifiers





(1) Syncope:  


Qualified Code:  R55 - Syncope, unspecified syncope type





Jenna Butler PA-C May 1, 2017 9:35 am

## 2017-05-02 VITALS
HEART RATE: 50 BPM | RESPIRATION RATE: 17 BRPM | DIASTOLIC BLOOD PRESSURE: 81 MMHG | OXYGEN SATURATION: 95 % | TEMPERATURE: 98 F | SYSTOLIC BLOOD PRESSURE: 107 MMHG

## 2017-05-02 VITALS
RESPIRATION RATE: 16 BRPM | DIASTOLIC BLOOD PRESSURE: 69 MMHG | TEMPERATURE: 96.9 F | HEART RATE: 50 BPM | SYSTOLIC BLOOD PRESSURE: 115 MMHG | OXYGEN SATURATION: 95 %

## 2017-05-02 VITALS
SYSTOLIC BLOOD PRESSURE: 130 MMHG | OXYGEN SATURATION: 96 % | DIASTOLIC BLOOD PRESSURE: 75 MMHG | TEMPERATURE: 96.5 F | RESPIRATION RATE: 15 BRPM | HEART RATE: 50 BPM

## 2017-05-02 VITALS
RESPIRATION RATE: 20 BRPM | HEART RATE: 51 BPM | TEMPERATURE: 97.9 F | OXYGEN SATURATION: 96 % | SYSTOLIC BLOOD PRESSURE: 130 MMHG | DIASTOLIC BLOOD PRESSURE: 72 MMHG

## 2017-05-02 VITALS — HEART RATE: 47 BPM

## 2017-05-02 RX ADMIN — PYRIDOSTIGMINE BROMIDE SCH MG: 60 TABLET ORAL at 07:37

## 2017-05-02 RX ADMIN — CYCLOBENZAPRINE HYDROCHLORIDE PRN MG: 10 TABLET, FILM COATED ORAL at 07:43

## 2017-05-02 RX ADMIN — PHENYTOIN SODIUM SCH MLS/HR: 50 INJECTION INTRAMUSCULAR; INTRAVENOUS at 07:38

## 2017-05-02 RX ADMIN — MORPHINE SULFATE PRN MG: 2 INJECTION, SOLUTION INTRAMUSCULAR; INTRAVENOUS at 07:43

## 2017-05-02 RX ADMIN — BETAMETHASONE DIPROPIONATE SCH APPLIC: 0.5 CREAM TOPICAL at 07:39

## 2017-05-02 RX ADMIN — Medication SCH ML: at 07:37

## 2017-05-02 RX ADMIN — DIVALPROEX SODIUM SCH MG: 500 TABLET, EXTENDED RELEASE ORAL at 07:37

## 2017-05-02 NOTE — MB
cc:

ARTUR CORTEZ DO

****

 

 

DATE OF CONSULTATION:

May 2, 2017

 

REASON FOR CONSULTATION

Bradycardia with a history of syncope.

 

HISTORY OF PRESENT ILLNESS

Raji Mcleod is a pleasant 57-year-old male who presented to [facility] emergency room on April 29, 2017 due to three syncopal episodes.

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

[employer] but notes that he did not feel well today. He did eat breakfast

that morning.

He states that he attempted to drink water throughout the morning.

 

While there he had a sudden onset of lightheadedness and dizziness and started

have a slightly.  Started having fuzziness of his vision as he started to get

tunnel vision.  He ended up passing out at that time for short period of time.

 

 

The second time he was walking into [employer] and started getting a similar type

feeling and went down to his knees until someone could help him up.  The last

time he was sitting on a tension at this time he had a similar type feeling and

fell forward striking his head on the ground.

 

After sitting there for a while he felt better so his fianc e took him home.

At home his blood pressure was in the 70s over 50s and therefore his Fianc e

urged him to come to the emergency room. I arrival to the emergency room the

blood pressure was 120/69 with a heart rate of 86.

 

He then was worked up for the syncopal episode and while here he was noted to

be bradycardic on telemetry with heart rates in the 45-60 range consistently.

I was asked to see him for consideration of further recommendations due to his

syncope with bradycardia.  In seeing him he is currently asymptomatic with no

chest pain, shortness of breath or palpitations.

 

PAST MEDICAL HISTORY                                              1.   Seizure

disorder.

2.   Migraines

3.   Anxiety.

4.   Depression

5.   Psoriasis.

 

PAST SURGICAL HISTORY

1. Hernia repair.

2. Left eyelid surgery.

3. Sinus surgery.

 

ALLERGIES

1. LOMUSTINE

2. PENICILLIN

 

MEDICATIONS

1. Divalproex ER 1000 mg daily.

2. Floxuridine 60 mg daily.

3. Trazodone 200 mg every night.

4. Quetiapine  mg every night

5. Hydroxyzine 100 mg four times a day as needed for itching

6. Ambien 5 mg every night as needed for insomnia.

7. Hydrocodone / acetaminophen 5/325 mg every 4 hours as needed for pain.

8. Pyridostigmine ER 60 mg t.i.d.

 

FAMILY HISTORY

Denies premature coronary artery disease or sudden cardiac death within the

family.

 

SOCIAL HISTORY

Denies tobacco, alcohol or drugs.

 

REVIEW OF SYSTEMS

14-systems were reviewed including osteopathic pertinent positives and

negatives above otherwise negative.

 

PHYSICAL EXAMINATION

VITAL SIGNS: Temperature 98.0, heart rate 50, blood pressure 126/70,

respirations 17, pulse ox 95% on room air.

IN GENERAL: The patient appears well in no acute distress, alert awake and

oriented x3.

HEAD, EYES, EARS, NOSE, AND THROAT: Extraocular muscles intact.  Mucous

membranes moist.

NECK:  Supple.  No JVD at 45 degrees.  No carotid bruits heard bilaterally.

Carotid upstroke is brisk in nature.

HEART:  Heart is regular rate and rhythm.  Positive first and second heart

sounds with a 1/6 holosystolic murmur noted at the apex.

LUNGS:  Clear to auscultation bilaterally.  No wheezes, rales or rhonchi.

ABDOMEN: Soft, nontender, nondistended.  No organomegaly noted.  EXTREMITIES:

Show no clubbing, cyanosis or edema.  Femoral and distal pulses intact

bilaterally.

NEUROLOGIC: No focal deficits.

SKIN: Warm, dry and intact.  Osteopathically, no kyphoscoliosis, lordosis or

paraspinal tender points.

 

LABORATORY FINDINGS

Hemoglobin 13.1, hematocrit 37.5, platelets 168.  BUN 16, creatinine 0.74,

troponin negative x3.

 

Electrocardiogram (April 29, 2017 at 16, 30) normal sinus rhythm, nonspecific

ST-T wave changes.

 

Echocardiogram (May 1, 2017) ejection fraction 60%, mild mitral regurgitation,

mild tricuspid regurgitation.

 

IMPRESSION

1. Syncopal episode with head trauma.

2. Dehydration.

3. Acute back pain secondary to fall.

4. History of seizure disorder

5. Asymptomatic bradycardia.

6. Migraines

7. Asymptomatic tachyarrhythmia.(Seven beat run of PAT)

 

RECOMMENDATIONS

1. Mr. Mcleod appears to have had three syncopal episodes and this may be

     multifactorial including his baseline bradycardia as well as dehydration.

2. At this time I found him up and walking and he feels fine without dizziness.

     His heart rate increases to 70s to 80s with walking.

3. He did have a short tachyarrhythmia of seven beats which appears to be PAT

     and not thought to be atrial fibrillation as the R to R interval was

     consistent.  If he did have a tachyarrhythmia at a high enough rate.  This

     could have been a cause for his lightheadedness.

4. Since being in the hospital he has had heart rates 45-60 beats per minute

     with no high-grade AV blocks.  He is currently asymptomatic at these

     rates.

5. I believe that he is cardiovascularly stable for discharge but I did ask

     [facility] an event monitor

     or loop recorder for long-term rhythm assessment to make sure that he has

     no marcelino or tachy arrhythmias that cause these symptoms.

6. He should have his TSH checked due to his baseline bradycardia.

7. Lastly he is on Quetiapine and pyridostigmine which can both further

     potentiate bradycardia.  I will leave this to his primary care physician

     to consider decreasing these if further events happen.

 

Thank you for allowing me to see Raji Mcleod, if there are any questions

please do not hesitate to call.

 

 

 

                              _________________________________

                              Artur Cortez DO

 

 

 

VGP/keiko

D:  5/2/2017/11:57 AM

T:  5/2/2017/12:20 PM

Visit #:  [id number]

Job #:  [id number]

KYLIE

## 2017-05-02 NOTE — HHI.DS
__________________________________________________





Discharge Summary


Admission Date


Apr 29, 2017 at 19:24


Discharge Date:  May 2, 2017


Admitting Diagnosis


SYNCOPE





(1) Syncope


ICD Code:  R55


Diagnosis:  Principal





(2) Dehydration


ICD Code:  E86.0


Diagnosis:  Principal





(3) Seizure disorder


ICD Code:  G40.909


Diagnosis:  Secondary





(4) Bradycardia


ICD Code:  R00.1


Diagnosis:  Principal





(5) Mild closed head injury


ICD Code:  S09.90XA


Diagnosis:  Principal





Procedures


None


Brief History - From Admission


This is a 57-year-old male with a PMH of Seizure Disorder, Migraine, Anxiety, 

Depression and Psoriasis who presented to the ER after syncopal event.  Per 

patient he "didn't feel right all-day", was at [employer] with a friend and felt 

sudden onset of lightheadedness/dizziness and sat down on a bench, then had 

syncopal event w/ head trauma after striking head on ground.  Reports 2 

subsequent syncopal events as well.  Denies seizure activity.  On arrival, BP 

120/69, HR 86, O2 sat 100% on RA, Afebrile.  CBC unremarkable except for 

elevated neutrophil count.  Chemistry essentially unremarkable GFR 85, 

previously 1 21-7/13 16.  Troponin negative.  INR 1.0.  CT Head unremarkable.  

CT C-spine slight neural foraminal compromise, no acute findings.  CXR with 

slight left lung base atelectasis.


CBC/BMP:  


4/30/17 0630                                                                   

             4/30/17 0630





Significant Findings





Laboratory Tests








Test 4/29/17 4/30/17 4/30/17





 16:58 01:24 06:30


 


Neutrophils (%) (Auto) 74.3 %  





 (16.0-70.0)  


 


Monocytes (%) (Auto) 9.4 % (0.0-8.0)  8.3 % (0.0-8.0)


 


Blood Urea Nitrogen 22 MG/DL (7-18)  


 


Estimat Glomerular Filtration 85 ML/MIN (>89)  





Rate   


 


Troponin I LESS THAN 0.02 LESS THAN 0.02 LESS THAN 0.02





 NG/ML NG/ML NG/ML





 (0.02-0.05) (0.02-0.05) (0.02-0.05)


 


Valproic Acid (Depakene) Level 49 MCG/ML  





 ()  


 


Red Blood Count   4.31 MIL/MM3





   (4.50-5.90)


 


Hematocrit   37.5 %





   (39.0-51.0)


 


Mean Platelet Volume   6.8 FL





   (7.0-11.0)


 


Chloride Level   111 MEQ/L





   ()


 


Anion Gap   4 MEQ/L (5-15)


 


Random Glucose   69 MG/DL





   ()


 


Calcium Level   8.0 MG/DL





   (8.5-10.1)


 


Total Protein   6.2 GM/DL





   (6.4-8.2)


 


Albumin   3.1 GM/DL





   (3.4-5.0)








Imaging





Last Impressions








Carotid Artery Ultrasound 4/30/17 0000 Signed





Impressions: 





 Service Date/Time:  Sunday, April 30, 2017 10:25 - CONCLUSION:  Carotid 





 ultrasound within normal limits.     Hany Machado MD 


 


Head CT 4/29/17 1624 Signed





Impressions: 





 Service Date/Time:  Saturday, April 29, 2017 17:53 - CONCLUSION: Unremarkable 





 study.     GAYLE Garcia MD 


 


Chest X-Ray 4/29/17 1624 Signed





Impressions: 





 Service Date/Time:  Saturday, April 29, 2017 16:32 - CONCLUSION:  Slight left 





 lung base atelectasis.     GAYLE Garcia MD 


 


Cervical Spine CT 4/29/17 1624 Signed





Impressions: 





 Service Date/Time:  Saturday, April 29, 2017 17:53 - CONCLUSION:  Slight 

neural 





 foramina compromise right C3-C4, left C5-C6, left C6-C7 and lateral recess 





 compromise left C6-7.     GAYLE Garcia MD 








PE at Discharge


GENERAL: Well-developed well-nourished.  In no acute distress.


SKIN: Warm and dry. No lesions noted.


HEENT: Normocephalic. Pupils equal and round. Mucous membranes pink and moist. 


CARDIOVASCULAR: Bradycardic rate and rhythm.  No murmur appreciated.


RESPIRATORY: No accessory muscle use. Clear to auscultation. Breath sounds 

equal bilaterally. 


GASTROINTESTINAL: Abdomen soft, non-tender, nondistended. Bowel sounds x4.


MUSCULOSKELETAL: No obvious deformities. No clubbing or cyanosis. No edema. 


NEUROLOGICAL: Awake and alert. No focal neurological deficits.  Moves upper and 

lower extremities spontaneously. Normal speech.


PSYCHIATRIC: Appropriate mood and affect; insight and judgment normal.


Pt update on day of discharge


Discussed with cardiology, Dr. Elias.  Dr. Elias [facility] 

[facility] for long-term monitor for heart rate.  He also 

recommends checking TSH, can be done as outpatient.  Plan of care discussed 

with the patient, all questions answered, verbalizes understanding.  Discharge 

home today.


Hospital Course


57-year-old male with a PMH of Seizure Disorder, Migraine, Anxiety, Depression 

and Psoriasis who presented to the ER after syncopal event.  





Syncope:  s/p syncopal event x2 w/ +head trauma.  CT Head/C-Spine w/ no acute 

findings.  Lightheadedness prior to event.  Monitor on telemetry. Given IVF for 

hydration. ACS ruled out with negative serial cardiac enzymes x3 and EKG with 

NSR and no acute ischemic changes.  Previously orthostatic negative, however 

mildly orthostatic today 5/2; educated on sleep position changes, HAILY hose. 

Carotid U/S unremarkable.  With hx of seizures, checked EEG which was normal.  

Echocardiogram with normal systolic function, EF 60%.  Telemetry monitoring did 

show sinus bradycardia.  Consulted cardiology who recommended outpatient 

cardiology follow-up for event monitor.





Dehydration:  GFR 85, previously 121 on 7/13/16.  Given IVF for hydration, 

repeat labs show improvement, .  Resolved.





Acute Back Pain: secondary to fall over bench after syncope. Continue Norco prn 

for pain and flexeril prn spasms. Heating pad. PT consulted, recommends walker 

[facility]. 





Seizure Disorder:  Controlled.  No reported seizure activity.  Valproic Acid 

level 49. Continue home medications.  EEG negative.


Pt Condition on Discharge:  Stable


Discharge Disposition:  Discharge Home


Discharge Time:  > 30 minutes


Discharge Instructions


DIET: Follow Instructions for:  As Tolerated, No Restrictions


Activities you can perform:  Regular-No Restrictions


Other Activity Instructions:  


Sit and stand up slowly


Follow up Referrals:  


Cardiology - 10 Days with [facility]


PCP Follow-up - 1 Week with [facility]





New Orders:  


TSH 3RD GEN - 3-5 Days





New Medications:  


Walker with Front Wheels (Walker with Front Wheels) 1 Mis Mis


1 EA .ROUTE AS DIRECTED #1 Ref 0 EA


Cyclobenzaprine (Flexeril) 10 Mg Tab


5 MG PO Q8H No driving on muscle relaxers PRN muscle spasms #15 TAB


Hydrocodone-Acetaminophen (Hydrocodone-Acetaminophen) 5-325 mg Tab


1 TAB PO Q4H PRN PAIN SCALE 3 TO 5 #10 TAB


 


Continued Medications:  


Clobetasol Topical (Clobetasol Topical) 0.05% Cream


1 APPLIC TOPICAL BID #15 Ref 0 GM


Divalproex ER (Divalproex ER) 500 Mg Tab


1000 MG PO DAILY Control Seizures #60 Ref 0 TAB


Fluoxetine HCl (Pmdd) (Fluoxetine HCl) 20 Mg Tab


60 MG PO DAILY


Hydroxyzine Pamoate (Hydroxyzine Pamoate) 100 Mg Cap


100 MG PO QID PRN ITCHING Ref 0 CAP


Lactic Acid (Ammonium Lactate) (Ammonium Lactate) 12 % Cre


1 APPLIC TOP BID APPLY TO: CRE


Pyridostigmine ER (Pyridostigmine ER) 180 Mg Tab


60 MG PO TID Manage Myastenia Gravis #30 Ref 0 TAB


Quetiapine Fumarate (Quetiapine Fumarate ER) 200 Mg Tab


150 MG PO HS


Trazodone HCl (Trazodone HCl) 100 Mg Tab


200 MG PO HS


Zolpidem (Zolpidem) 5 Mg Tab


5 MG PO HS PRN INSOMNIA Ref 0 TAB











Trevin Gilliam May 2, 2017 12:20

## 2017-05-02 NOTE — HHI.PR
Subjective


Remarks


Follow-up for syncope.  The patient still feels a little sore all over from his 

fall.  He states he had some mild lightheadedness with checking orthostatic 

blood pressures today, states she's had some sensation of lightheadedness with 

position changes in the past.  However he ambulated with PT yesterday with no 

difficulties or symptoms.  At this time he denies any chest pain or shortness 

of breath.  His heart rate is noted to be approximately 40 bpm while awake at 

the time of evaluation.  He states that he said the sensation of palpitations 

in the past, but none recently.  He states that he was sitting at the time of 

his syncopal episode.





Objective


Vitals





 Vital Signs








  Date Time  Temp Pulse Resp B/P Pulse Ox O2 Delivery O2 Flow Rate FiO2


 


5/2/17 08:17 98.0 50 17 126/78 95   





    122/81    





    107/73    


 


5/2/17 07:55  47      


 


5/2/17 04:00 96.9 50 16 115/69 95   


 


5/2/17 00:52   20     


 


5/2/17 00:00 96.5 50 15 130/75 96   


 


5/1/17 22:45  60      


 


5/1/17 20:00 96.7 59 16 116/70 95   


 


5/1/17 15:44 97.6 56 18 134/79 96   


 


5/1/17 12:57   16     


 


5/1/17 12:00 97.8 61 18 126/76 95   








 I/O








 5/1/17 5/1/17 5/1/17 5/2/17 5/2/17 5/2/17





 07:00 15:00 23:00 07:00 15:00 23:00


 


Intake Total 1108 ml 420 ml    


 


Output Total 800 ml 320 ml    


 


Balance 308 ml 100 ml    


 


      


 


Intake Oral 400 ml 420 ml    


 


IV Total 708 ml     


 


Output Urine Total 800 ml 320 ml    








Result Diagram:  


4/30/17 0630                                                                   

             4/30/17 0630





Imaging





Last Impressions








Carotid Artery Ultrasound 4/30/17 0000 Signed





Impressions: 





 Service Date/Time:  Sunday, April 30, 2017 10:25 - CONCLUSION:  Carotid 





 ultrasound within normal limits.     Hany Machado MD 


 


Head CT 4/29/17 1624 Signed





Impressions: 





 Service Date/Time:  Saturday, April 29, 2017 17:53 - CONCLUSION: Unremarkable 





 study.     GAYLE Garcia MD 


 


Chest X-Ray 4/29/17 1624 Signed





Impressions: 





 Service Date/Time:  Saturday, April 29, 2017 16:32 - CONCLUSION:  Slight left 





 lung base atelectasis.     GAYLE Garcia MD 


 


Cervical Spine CT 4/29/17 1624 Signed





Impressions: 





 Service Date/Time:  Saturday, April 29, 2017 17:53 - CONCLUSION:  Slight 

neural 





 foramina compromise right C3-C4, left C5-C6, left C6-C7 and lateral recess 





 compromise left C6-7.     GAYLE Garcia MD 








Objective Remarks


GENERAL: Well-developed well-nourished.  In no acute distress.


SKIN: Warm and dry. No lesions noted.


HEENT: Normocephalic. Pupils equal and round. Mucous membranes pink and moist. 


CARDIOVASCULAR: Bradycardic rate and rhythm.  No murmur appreciated.


RESPIRATORY: No accessory muscle use. Clear to auscultation. Breath sounds 

equal bilaterally. 


GASTROINTESTINAL: Abdomen soft, non-tender, nondistended. Bowel sounds x4.


MUSCULOSKELETAL: No obvious deformities. No clubbing or cyanosis. No edema. 


NEUROLOGICAL: Awake and alert. No focal neurological deficits.  Moves upper and 

lower extremities spontaneously. Normal speech.


PSYCHIATRIC: Appropriate mood and affect; insight and judgment normal.





A/P


Problem List:  


(1) Syncope


ICD Code:  R55


Status:  Acute


(2) Dehydration


ICD Code:  E86.0


Status:  Resolved


(3) Seizure disorder


ICD Code:  G40.909


Status:  Chronic


(4) Migraine


ICD Code:  G43.909


Status:  Chronic


Assessment and Plan


57-year-old male with a PMH of Seizure Disorder, Migraine, Anxiety, Depression 

and Psoriasis who presented to the ER after syncopal event.  





Syncope:  s/p syncopal event x2 w/ +head trauma.  CT Head/C-Spine w/ no acute 

findings.  Lightheadedness prior to event.  Monitor on telemetry. Given IVF for 

hydration. ACS ruled out with negative serial cardiac enzymes x3 and EKG with 

NSR and no acute ischemic changes.  Previously orthostatic negative, however 

mildly orthostatic today 5/2; educated on sleep position changes, HAILY hose. 

Carotid U/S unremarkable.  With hx of seizures, checked EEG which was normal.  

Echocardiogram with normal systolic function, EF 60%.  Telemetry monitoring 

shows sinus bradycardia.  Consult cardiology.





Dehydration:  GFR 85, previously 121 on 7/13/16.  Given IVF for hydration, 

repeat labs show improvement, .  Resolved.  DC IVF.





Acute Back Pain: secondary to fall over bench after syncope. Continue Norco prn 

for pain and flexeril prn spasms. Heating pad. PT consulted, [facility] 

[facility] 





Seizure Disorder:  Controlled.  No reported seizure activity.  Valproic Acid 

level 49. Continue home medications.  EEG negative. 





Migraine:  Takes Tylenol at home, continue analgesics as needed. 





DVT Prophylaxis:  SCD/Teds.


Discharge Planning


Follow-up cardiology recommendations.





Problem Qualifiers





(1) Syncope:  


Qualified Code:  R55 - Syncope, unspecified syncope type





Trevin Gilliam May 2, 2017 08:40

## 2017-07-16 ENCOUNTER — HOSPITAL ENCOUNTER (EMERGENCY)
Dept: HOSPITAL 17 - NEPD | Age: 57
Discharge: HOME | End: 2017-07-16
Payer: OTHER GOVERNMENT

## 2017-07-16 VITALS
SYSTOLIC BLOOD PRESSURE: 118 MMHG | OXYGEN SATURATION: 98 % | HEART RATE: 68 BPM | DIASTOLIC BLOOD PRESSURE: 73 MMHG | RESPIRATION RATE: 20 BRPM | TEMPERATURE: 97.4 F

## 2017-07-16 VITALS — HEIGHT: 69 IN | BODY MASS INDEX: 27.76 KG/M2 | WEIGHT: 187.39 LBS

## 2017-07-16 DIAGNOSIS — Z86.59: ICD-10-CM

## 2017-07-16 DIAGNOSIS — Z86.79: ICD-10-CM

## 2017-07-16 DIAGNOSIS — M79.604: Primary | ICD-10-CM

## 2017-07-16 DIAGNOSIS — Z87.39: ICD-10-CM

## 2017-07-16 DIAGNOSIS — R20.0: ICD-10-CM

## 2017-07-16 DIAGNOSIS — Z87.2: ICD-10-CM

## 2017-07-16 PROCEDURE — 99283 EMERGENCY DEPT VISIT LOW MDM: CPT

## 2017-07-16 NOTE — PD
HPI


Chief Complaint:  Pain: Acute or Chronic


Time Seen by Provider:  09:42


Travel History


International Travel<30 days:  No


Contact w/Intl Traveler<30days:  No


Traveled to known affect area:  No





History of Present Illness


HPI


Patient is a 57-year-old male who comes in complaining of pain to his right leg 

from his hip down to his knee.  He has had this pain ever since having a hernia 

repair 2 years ago.  He says in the past few months is gotten worse.  Says 

recently he has not been able to sleep at night due to the pain.  He says the 

top of his leg feels numb.  He has seen his primary care doctor for this and 

has an appointment with a neurologist next month.  He denies any new injuries.  

He says sometimes the pain is so bad that his leg gives out.  He denies any 

back pain.  He denies any incontinence.  He denies any fever or chills.





PFSH


Past Medical History


Arthritis:  Yes


Anxiety:  Yes


Depression:  Yes


Cerebrovascular Accident:  Yes


Diabetes:  No


Musculoskeletal:  Yes (DEGENERATIVE DISC DISEASE, BULGING DISC)


Integumentary:  Yes (PSORIASIS)


Immunizations Current:  Yes





Past Surgical History


Abdominal Surgery:  Yes (HERNIA REPAIR RIGHT LOWER GROIN)


Eye Surgery:  Yes (left eyelid)


Other Surgery:  Yes (SINUS)





Social History


Alcohol Use:  No (3 years ago)


Tobacco Use:  No


Substance Use:  No





Allergies-Medications


(Allergen,Severity, Reaction):  


Coded Allergies:  


     Lomustine (Verified  Allergy, Severe, Rash, 7/16/17)


     Penicillin (Verified  Allergy, Severe, Rash, 7/16/17)


Reported Meds & Prescriptions





Reported Meds & Active Scripts


Active


Walker with Front Wheels (Device) 1 Mis Mis 1 Ea .ROUTE AS DIRECTED


Flexeril (Cyclobenzaprine HCl) 10 Mg Tab 5 Mg PO Q8H PRN


     No driving on muscle relaxers


Hydrocodone-Acetaminophen 5-325 mg Tab 1 Tab PO Q4H PRN


Reported


Melatonin 5 Mg Tab 6 Mg PO HS


Ammonium Lactate (Lactic Acid) 12 % Cre 1 Applic TOP BID


     


     


     APPLY TO:


Clobetasol Topical (Clobetasol Propionate) 0.05% Cream 1 Applic TOPICAL BID


Pyridostigmine ER (Pyridostigmine Bromide) 180 Mg Tab 60 Mg PO TID


Fluoxetine HCl (Fluoxetine HCl (Pmdd)) 20 Mg Tab 60 Mg PO DAILY


Quetiapine Fumarate ER (Quetiapine Fumarate) 200 Mg Tab 150 Mg PO HS


Hydroxyzine Pamoate 100 Mg Cap 100 Mg PO QID PRN


Divalproex ER (Divalproex Sodium) 500 Mg Tab 1,000 Mg PO DAILY








Review of Systems


Except as stated in HPI:  all other systems reviewed are Neg


General / Constitutional:  No: Fever, Chills


HENT:  No: Headaches, Lightheadedness


Cardiovascular:  No: Chest Pain or Discomfort


Respiratory:  No: Shortness of Breath


Gastrointestinal:  No: Nausea, Vomiting


Musculoskeletal:  Positive: Pain,  No: Edema


Skin:  No Rash, No Change in Pigmentation


Neurologic:  Positive: Paresthesia





Physical Exam


Narrative


GENERAL: Awake and alert, in no acute distress.


SKIN: Focused skin assessment warm/dry.  No erythema or rashes, no signs of 

infection.


HEAD: Atraumatic. Normocephalic. 


EYES: Pupils equal and round. No scleral icterus. 


ENT: Mucous membranes pink and moist.


NECK: Trachea midline. No JVD. 


CARDIOVASCULAR: Regular rate and rhythm.  No murmur appreciated.


RESPIRATORY: No accessory muscle use. Clear to auscultation. Breath sounds 

equal bilaterally. 


GASTROINTESTINAL: Abdomen soft, non-tender, nondistended.  No evidence of right 

inguinal hernia.


MUSCULOSKELETAL: No obvious deformities. No clubbing.  No cyanosis.  No edema.  

No tenderness to palpation of the right leg.  No tenderness to palpation of the 

lumbar spine.  


NEUROLOGICAL: Awake and alert. No obvious cranial nerve deficits.  Motor 

grossly within normal limits. Normal speech.  Good strength in both legs.  

Decreased sensation to the right quadricep area.  Pedal pulses intact.


PSYCHIATRIC: Appropriate mood and affect; insight and judgment normal.





Data


Data


Last Documented VS





Vital Signs








  Date Time  Temp Pulse Resp B/P Pulse Ox O2 Delivery O2 Flow Rate FiO2


 


7/16/17 09:04 97.4 68 20 118/73 98 Room Air  








Orders





 Acetamin-Hydrocod 325-5 Mg (Norco  5-325 (7/16/17 10:15)








MDM


Medical Decision Making


Medical Screen Exam Complete:  Yes


Emergency Medical Condition:  Yes


Medical Record Reviewed:  Yes


Differential Diagnosis


Muscle strain versus nerve injury versus chronic pain


Narrative Course


Patient is a 57-year-old male comes in complaining of right leg pain.  He has 

had this pain for a while, but says he cannot take it anymore.  Exam shows some 

decreased sensation to the top of the right leg.  Patient has no incontinence, 

no back pain, no injury.  This is likely nerve pain from his surgery.  He has 

an appointment with neurology next month.  He is given pain medicine here.  

Will be discharged with a prescription for pain medicine.  He is advised to 

keep his follow-up appointment.  Advised to return to the emergency department 

as needed for any worsening symptoms.





Diagnosis





 Primary Impression:  


 Leg pain, anterior


 Qualified Code:  M79.604 - Leg pain, anterior, right


Patient Instructions:  General Instructions, Leg Pain (ED), Peripheral 

Neuropathy (ED)





***Additional Instructions:


Follow up at your scheduled appointments.  Take pain medicine as needed.  

Return to the ED as needed for any worsening symptoms.


Scripts


Hydrocodone-Acetaminophen (Lortab)5-325 Mg Tab1 Tab PO Q6H PRN (PAIN) #20 TAB  

Ref 0


   Prov:Uma Velazquez MD         7/16/17


Disposition:  01 DISCHARGE HOME


Condition:  Stable








Uma Velazquez MD Jul 16, 2017 10:41

## 2018-01-12 ENCOUNTER — HOSPITAL ENCOUNTER (EMERGENCY)
Dept: HOSPITAL 17 - NED | Age: 58
Discharge: LEFT BEFORE DISCHARGE | End: 2018-01-12
Payer: OTHER GOVERNMENT

## 2018-01-12 VITALS — HEIGHT: 69 IN | BODY MASS INDEX: 27.46 KG/M2 | WEIGHT: 185.39 LBS

## 2018-01-12 VITALS
OXYGEN SATURATION: 99 % | TEMPERATURE: 98.3 F | SYSTOLIC BLOOD PRESSURE: 138 MMHG | DIASTOLIC BLOOD PRESSURE: 82 MMHG | RESPIRATION RATE: 12 BRPM | HEART RATE: 77 BPM

## 2018-01-12 DIAGNOSIS — Z53.21: ICD-10-CM

## 2018-01-12 DIAGNOSIS — R51: Primary | ICD-10-CM

## 2018-01-12 PROCEDURE — 99281 EMR DPT VST MAYX REQ PHY/QHP: CPT

## 2018-01-13 NOTE — PD
Physical Exam


Date Seen by Provider:  Jan 12, 2018


Time Seen by Provider:  22:33


Narrative


57 year old male presents to the emergency department for evaluation of a 

headache that radiates to his neck for 6 days.  He does report a history of 

migraine headaches, but states he does not normally last this long.  He has 

tried Fioricet, Depakote, Excedrin Migraine without improvement.  Current pain 

is 7/10.





Data


Data


Last Documented VS





Vital Signs








  Date Time  Temp Pulse Resp B/P (MAP) Pulse Ox O2 Delivery O2 Flow Rate FiO2


 


1/12/18 18:47 98.3 77 12 138/82 (100) 99   











MDM


Supervised Visit with PAUL:  No


Narrative Course


57-year-old male presents to the emergency department for evaluation of a 

headache for the past 6 days.  Patient initially seen in triage.  He is 

awaiting a medical bed for further evaluation and disposition.





Patient left AGAINST MEDICAL ADVICE before being moved to a medical bed.


Diagnosis





 Primary Impression:  


 Left against medical advice


 Additional Impression:  


 Headache


 Qualified Codes:  R51 - Headache


Disposition:  07 AGAINST MEDICAL ADVICE











Hali Clark Jan 13, 2018 11:27

## 2018-01-30 ENCOUNTER — HOSPITAL ENCOUNTER (EMERGENCY)
Dept: HOSPITAL 17 - NEPD | Age: 58
Discharge: HOME | End: 2018-01-30
Payer: OTHER GOVERNMENT

## 2018-01-30 VITALS — WEIGHT: 185.39 LBS | BODY MASS INDEX: 27.46 KG/M2 | HEIGHT: 69 IN

## 2018-01-30 VITALS
HEART RATE: 87 BPM | RESPIRATION RATE: 17 BRPM | OXYGEN SATURATION: 98 % | TEMPERATURE: 98.1 F | SYSTOLIC BLOOD PRESSURE: 133 MMHG | DIASTOLIC BLOOD PRESSURE: 78 MMHG

## 2018-01-30 DIAGNOSIS — S20.211A: ICD-10-CM

## 2018-01-30 DIAGNOSIS — S09.90XA: Primary | ICD-10-CM
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
## 2018-01-30 PROCEDURE — 99284 EMERGENCY DEPT VISIT MOD MDM: CPT

## 2018-01-30 PROCEDURE — 71046 X-RAY EXAM CHEST 2 VIEWS: CPT

## 2018-01-30 PROCEDURE — 72125 CT NECK SPINE W/O DYE: CPT

## 2018-01-30 PROCEDURE — 70450 CT HEAD/BRAIN W/O DYE: CPT

## 2018-01-30 PROCEDURE — 96372 THER/PROPH/DIAG INJ SC/IM: CPT

## 2018-01-30 NOTE — PD
HPI


Chief Complaint:  Fall


Time Seen by Provider:  19:16


Travel History


International Travel<30 days:  No


Contact w/Intl Traveler<30days:  No


Traveled to known affect area:  No





History of Present Illness


HPI


58-year-old male with remote history of CVA, myasthenia gravis, presents to the 

emergency department for evaluation following a trip and fall down 5-7 stairs 

approximately 4 hours ago.  Patient is uncertain if he struck his head or less 

consciousness, however his wife states that he did remain conscious through the 

entire event..  He reports right anterior rib pain, aching, constant, worse 

with deep inspiration.  He has had no nausea or vomiting.  He has no new focal 

deficits or weakness.  Patient denies any hemoptysis.  He has been ambulatory.  

His wife reported that he is acting normal since the fall.  He is not any 

anticoagulation therapy.  He has no other symptoms to report at this time.





PFSH


Past Medical History


Arthritis:  Yes


Anxiety:  Yes


Depression:  Yes


Cerebrovascular Accident:  Yes


Diabetes:  No


Musculoskeletal:  Yes (DEGENERATIVE DISC DISEASE, BULGING DISC)


Integumentary:  Yes (PSORIASIS)


Immunizations Current:  Yes





Past Surgical History


Abdominal Surgery:  Yes (HERNIA REPAIR RIGHT LOWER GROIN)


Eye Surgery:  Yes (left eyelid)


Other Surgery:  Yes (SINUS)





Social History


Alcohol Use:  No (3 years ago)


Tobacco Use:  No


Substance Use:  No





Allergies-Medications


(Allergen,Severity, Reaction):  


Coded Allergies:  


     lomustine (Unverified  Allergy, Severe, Rash, 8/15/17)


     penicillin G (Unverified  Allergy, Severe, Rash, 8/15/17)


Reported Meds & Prescriptions





Reported Meds & Active Scripts


Active


Walker with Front Wheels (Device) 1 Mis Mis 1 Ea .ROUTE AS DIRECTED


Reported


Topamax (Topiramate) 25 Mg Tab 25 Mg PO BID


Trazodone (Trazodone HCl) 100 Mg Tablet 200 Mg PO HS


Prednisone 5 Mg Tab 5 Mg PO DAILY


Melatonin 5 Mg Tab 6 Mg PO HS


Pyridostigmine ER (Pyridostigmine Bromide) 180 Mg Tab 60 Mg PO TID


Fluoxetine HCl (Fluoxetine HCl (Pmdd)) 20 Mg Tab 60 Mg PO DAILY


Quetiapine Fumarate ER (Quetiapine Fumarate) 200 Mg Tab 150 Mg PO HS


Divalproex ER (Divalproex Sodium) 500 Mg Tab 1,000 Mg PO DAILY








Review of Systems


Except as stated in HPI:  all other systems reviewed are Neg





Physical Exam


Narrative


GENERAL: Well-nourished male patient, in no acute distress.


SKIN: Focused skin assessment warm/dry.


HEAD: Atraumatic. Normocephalic. 


EYES: Pupils equal and round. No scleral icterus. No injection or drainage.  

EOMI.  PERRL


ENT: Mucosa pink and moist. No erythema or exudates. No uvular edema. No uvular

, palatal, or tonsillar deviation. Airway patent. Nasal turbinates appear 

normal without nasal blood, purulent drainage or septal hematoma.


NECK: Trachea midline. No JVD.  No cervical spine tenderness.  No limitations 

in range of motion cervical spine.


CARDIOVASCULAR: Regular rate and rhythm.  No murmur appreciated.


RESPIRATORY: No accessory muscle use. Clear to auscultation. Breath sounds 

equal bilaterally.  Tenderness elicited palpation of the right anterior rib 

cage.  No crepitus.  Even respirations.


GASTROINTESTINAL: Abdomen soft, non-tender, nondistended.  No guarding.  No 

rebound tenderness.  Hepatic and splenic margins not palpable. 


MUSCULOSKELETAL: No obvious deformities. No clubbing.  No cyanosis.  No edema. 


NEUROLOGICAL: Awake and alert. No obvious cranial nerve deficits.  Motor 

grossly within normal limits. Normal speech.


PSYCHIATRIC: Appropriate mood and affect; insight and judgment normal.





Data


Data


Last Documented VS





Vital Signs








  Date Time  Temp Pulse Resp B/P (MAP) Pulse Ox O2 Delivery O2 Flow Rate FiO2


 


1/30/18 19:15  87 17  98 Room Air  


 


1/30/18 17:24 98.1   133/78 (96)    








Orders





 Orders


Ct Brain W/O Iv Contrast(Rout) (1/30/18 )


Ct Cerv Spine W/O Contrast (1/30/18 )


Chest, Pa & Lat (1/30/18 )


Ketorolac Inj (Toradol Inj) (1/30/18 19:30)


Orphenadrine Inj (Norflex Inj) (1/30/18 19:30)


Ed Discharge Order (1/30/18 19:40)








[facility]


Medical Decision Making


Medical Screen Exam Complete:  Yes


Emergency Medical Condition:  Yes


Medical Record Reviewed:  Yes


Differential Diagnosis


Minor head injury versus concussion versus intracranial hemorrhage versus rib 

contusion versus fracture versus pneumothorax


Narrative Course


58-year-old male presents to emergency department following a fall.  Patient 

appears without distress.  Vital signs are stable.  He does have anterior rib 

cage tenderness to palpation, otherwise exam is benign.


Patient is treated for pain.  Imaging studies are complete and reviewed.  I 

have discussed with the patient.  He'll be discharged home with pain control.  

He is encouraged to follow-up with primary care provider and return immediately 

with any acute worsening of symptoms





Last Impressions








Head CT 1/30/18 0000 Signed





Impressions: 





 Service Date/Time:  Tuesday, January 30, 2018 18:52 - CONCLUSION:  No acute 





 disease.       Dequan Cantrell MD 


 


Chest X-Ray 1/30/18 0000 Signed





Impressions: 





 Service Date/Time:  Tuesday, January 30, 2018 18:02 - CONCLUSION:  Left 

basilar 





 discoid atelectasis.     Dequan Cantrell MD 


 


Cervical Spine CT 1/30/18 0000 Signed





Impressions: 





 Service Date/Time:  Tuesday, January 30, 2018 18:52 - CONCLUSION:  1. Moderate 





 to severe bilateral foraminal narrowing at C5-6 and C6-7.  2. Moderate to 

severe 





 right neuroforaminal narrowing and mild left neural foraminal at C3-4 and C4-

5.  





 3. Mild spinal stenosis is noted at C5-6.  4. Diffuse cervical spondylosis.  

5. 





 No acute fracture or prevertebral soft tissue swelling.     Dequan Cantrell MD 











Diagnosis





 Primary Impression:  


 Minor closed head injury


 Additional Impression:  


 Contusion of rib on right side


 Qualified Codes:  S20.211A - Contusion of right front wall of thorax, initial 

encounter


Referrals:  


Primary Care Physician


Patient Instructions:  General Instructions, Head Injury (ED), Rib Contusion (ED

)





***Additional Instructions:  


It is important that you take deep breaths and cough to reduce your risk of 

getting pneumonia


Follow-up with a primary care provider


Return immediately with any acute worsening symptoms


***Med/Other Pt SpecificInfo:  Prescription(s) given


Scripts


Methocarbamol (Robaxin) 500 Mg Tab


500 MG PO QID Y for MUSCLE SPASM, #20 TAB 0 Refills


   Prov: Jaleesa Vinson         1/30/18 


Ibuprofen (Ibuprofen) 600 Mg Tab


600 MG PO Q8HR Y for PAIN, #30 TAB 0 Refills


   Prov: Jaleesa Vinson         1/30/18


Disposition:  01 DISCHARGE HOME


Condition:  Stable











Jaleesa Vinson Jan 30, 2018 19:18

## 2018-01-30 NOTE — RADRPT
EXAM DATE/TIME:  01/30/2018 18:52 

 

[facility] COMPARISON:     

CT CERVICAL SPINE W/O CONTRAST, April 29, 2017, 17:53.

 

 

INDICATIONS :     

Neck pain due to fall.

                      

 

RADIATION DOSE:     

16.48 CTDIvol (mGy) 

 

 

 

MEDICAL HISTORY :     

Cerebrovascular disease.  

 

SURGICAL HISTORY :       

Hernia repair.

 

ENCOUNTER:      

Initial

 

ACUITY:      

1 day

 

PAIN SCALE:      

8/10

 

LOCATION:       

Bilateral  neck region.

 

TECHNIQUE:     

Volumetric scanning of the cervical spine was performed. Multiplanar reconstructions in the sagittal,
 coronal and oblique axial planes were performed.   Using automated exposure control and adjustment o
f the mA and/or kV according to patient size, radiation dose was kept as low as reasonably achievable
 to obtain optimal diagnostic quality images.   DICOM format image data is available electronically f
or review and comparison.  

 

FINDINGS:     

There is no acute fracture or prevertebral soft tissue swelling. Cervical spondylosis is noted at all
 levels. Moderate to severe right neural foraminal narrowing and mild left neural foraminal narrowing
 is noted at C3-4 and C4-5. Moderate to severe bilateral foraminal narrowing is noted at C5-6 and C6-
7. Mild scoliosis of the cervical spine is noted. Mild spinal stenosis is noted at C5-6. The bony rel
ationship and alignment between C1 and C2 is well maintained.

 

CONCLUSION: 

1. Moderate to severe bilateral foraminal narrowing at C5-6 and C6-7. 

2. Moderate to severe right neuroforaminal narrowing and mild left neural foraminal at C3-4 and C4-5.
 

3. Mild spinal stenosis is noted at C5-6. 

4. Diffuse cervical spondylosis. 

5. No acute fracture or prevertebral soft tissue swelling. 

 

 

 Dequan Cantrell MD on January 30, 2018 at 19:21           

Board Certified Radiologist.

 This report was verified electronically.

## 2018-01-30 NOTE — RADRPT
EXAM DATE/TIME:  01/30/2018 18:02 

 

[facility] COMPARISON:     

No previous studies available for comparison.

 

                     

INDICATIONS :     

Short of breath.

                     

 

MEDICAL HISTORY :     

None.          

 

SURGICAL HISTORY :     

None.   

 

ENCOUNTER:     

Initial                                        

 

ACUITY:     

1 day      

 

PAIN SCORE:     

5/10

 

LOCATION:     

Bilateral  chest

 

FINDINGS:     

Discoid atelectasis is noted within the left lung base. The heart is normal. The pulmonary vascular p
attern is normal. The right lung is clear. Mild degenerative changes and scoliosis of the thoracic sp
ine are noted.

 

CONCLUSION:     

Left basilar discoid atelectasis.

 

 

 

 Dequan Cantrell MD on January 30, 2018 at 18:20           

Board Certified Radiologist.

 This report was verified electronically.

## 2018-01-30 NOTE — RADRPT
EXAM DATE/TIME:  01/30/2018 18:52 

 

[facility] COMPARISON:     

CT BRAIN W/O CONTRAST, April 29, 2017, 17:53.

 

 

INDICATIONS :     

Head pain due to fall.

                      

 

RADIATION DOSE:     

36.49 CTDIvol (mGy) 

 

 

 

MEDICAL HISTORY :     

Cerebrovascular disease.  

 

SURGICAL HISTORY :       

Hernia repair.

 

ENCOUNTER:      

Initial

 

ACUITY:      

1 day

 

PAIN SCALE:      

8/10

 

LOCATION:        

cranial 

 

TECHNIQUE:     

Multiple contiguous axial images were obtained of the head.  Using automated exposure control and adj
ustment of the mA and/or kV according to patient size, radiation dose was kept as low as reasonably a
chievable to obtain optimal diagnostic quality images.   DICOM format image data is available electro
nically for review and comparison.  

 

FINDINGS:     

 

CEREBRUM:     

The ventricles are normal for age.  No evidence of midline shift, mass lesion, hemorrhage or acute in
farction.  No extra-axial fluid collections are seen.

 

POSTERIOR FOSSA:     

The cerebellum and brainstem are intact.  The 4th ventricle is midline.  The cerebellopontine angle i
s unremarkable.

 

EXTRACRANIAL:     

The visualized portion of the orbits is intact.

 

SKULL:     

The calvaria is intact.  No evidence of skull fracture.

 

CONCLUSION:     

No acute disease.  

 

 

 

 Dequan Cantrell MD on January 30, 2018 at 18:59           

Board Certified Radiologist.

 This report was verified electronically.

## 2018-06-17 ENCOUNTER — HOSPITAL ENCOUNTER (EMERGENCY)
Dept: HOSPITAL 17 - NEPE | Age: 58
Discharge: HOME | End: 2018-06-17
Payer: OTHER GOVERNMENT

## 2018-06-17 VITALS — OXYGEN SATURATION: 99 % | RESPIRATION RATE: 18 BRPM

## 2018-06-17 VITALS
OXYGEN SATURATION: 98 % | RESPIRATION RATE: 16 BRPM | TEMPERATURE: 97.1 F | HEART RATE: 106 BPM | DIASTOLIC BLOOD PRESSURE: 86 MMHG | SYSTOLIC BLOOD PRESSURE: 133 MMHG

## 2018-06-17 VITALS — HEIGHT: 69 IN | BODY MASS INDEX: 24.16 KG/M2 | WEIGHT: 163.14 LBS

## 2018-06-17 DIAGNOSIS — R42: ICD-10-CM

## 2018-06-17 DIAGNOSIS — R94.31: ICD-10-CM

## 2018-06-17 DIAGNOSIS — R63.4: ICD-10-CM

## 2018-06-17 DIAGNOSIS — R53.1: ICD-10-CM

## 2018-06-17 DIAGNOSIS — F43.10: ICD-10-CM

## 2018-06-17 DIAGNOSIS — Z88.0: ICD-10-CM

## 2018-06-17 DIAGNOSIS — E86.0: Primary | ICD-10-CM

## 2018-06-17 DIAGNOSIS — Z79.899: ICD-10-CM

## 2018-06-17 DIAGNOSIS — F32.9: ICD-10-CM

## 2018-06-17 DIAGNOSIS — R63.0: ICD-10-CM

## 2018-06-17 DIAGNOSIS — Z86.73: ICD-10-CM

## 2018-06-17 DIAGNOSIS — M19.90: ICD-10-CM

## 2018-06-17 LAB
ALBUMIN SERPL-MCNC: 4.1 GM/DL (ref 3.4–5)
ALP SERPL-CCNC: 63 U/L (ref 45–117)
ALT SERPL-CCNC: 12 U/L (ref 12–78)
AST SERPL-CCNC: 9 U/L (ref 15–37)
BASOPHILS # BLD AUTO: 0.1 TH/MM3 (ref 0–0.2)
BASOPHILS NFR BLD: 0.6 % (ref 0–2)
BILIRUB SERPL-MCNC: 1 MG/DL (ref 0.2–1)
BUN SERPL-MCNC: 22 MG/DL (ref 7–18)
CALCIUM SERPL-MCNC: 9.2 MG/DL (ref 8.5–10.1)
CHLORIDE SERPL-SCNC: 106 MEQ/L (ref 98–107)
COLOR UR: YELLOW
CREAT SERPL-MCNC: 0.85 MG/DL (ref 0.6–1.3)
EOSINOPHIL # BLD: 0 TH/MM3 (ref 0–0.4)
EOSINOPHIL NFR BLD: 0.3 % (ref 0–4)
ERYTHROCYTE [DISTWIDTH] IN BLOOD BY AUTOMATED COUNT: 12.9 % (ref 11.6–17.2)
GFR SERPLBLD BASED ON 1.73 SQ M-ARVRAT: 93 ML/MIN (ref 89–?)
GLUCOSE SERPL-MCNC: 126 MG/DL (ref 74–106)
GLUCOSE UR STRIP-MCNC: (no result) MG/DL
HCO3 BLD-SCNC: 24.4 MEQ/L (ref 21–32)
HCT VFR BLD CALC: 43.4 % (ref 39–51)
HGB BLD-MCNC: 15.6 GM/DL (ref 13–17)
HGB UR QL STRIP: (no result)
HYALINE CASTS #/AREA URNS LPF: 3 /LPF
KETONES UR STRIP-MCNC: (no result) MG/DL
LYMPHOCYTES # BLD AUTO: 2 TH/MM3 (ref 1–4.8)
LYMPHOCYTES NFR BLD AUTO: 21.1 % (ref 9–44)
MAGNESIUM SERPL-MCNC: 2.1 MG/DL (ref 1.5–2.5)
MCH RBC QN AUTO: 30.7 PG (ref 27–34)
MCHC RBC AUTO-ENTMCNC: 35.8 % (ref 32–36)
MCV RBC AUTO: 85.7 FL (ref 80–100)
MONOCYTE #: 0.8 TH/MM3 (ref 0–0.9)
MONOCYTES NFR BLD: 8.4 % (ref 0–8)
MUCOUS THREADS #/AREA URNS LPF: (no result) /LPF
NEUTROPHILS # BLD AUTO: 6.6 TH/MM3 (ref 1.8–7.7)
NEUTROPHILS NFR BLD AUTO: 69.6 % (ref 16–70)
NITRITE UR QL STRIP: (no result)
PLATELET # BLD: 234 TH/MM3 (ref 150–450)
PMV BLD AUTO: 6.6 FL (ref 7–11)
PROT SERPL-MCNC: 8.4 GM/DL (ref 6.4–8.2)
RBC # BLD AUTO: 5.07 MIL/MM3 (ref 4.5–5.9)
SODIUM SERPL-SCNC: 142 MEQ/L (ref 136–145)
SP GR UR STRIP: 1.01 (ref 1–1.03)
TROPONIN I SERPL-MCNC: (no result) NG/ML (ref 0.02–0.05)
URINE LEUKOCYTE ESTERASE: (no result)
WBC # BLD AUTO: 9.5 TH/MM3 (ref 4–11)

## 2018-06-17 PROCEDURE — 70450 CT HEAD/BRAIN W/O DYE: CPT

## 2018-06-17 PROCEDURE — 84484 ASSAY OF TROPONIN QUANT: CPT

## 2018-06-17 PROCEDURE — 83735 ASSAY OF MAGNESIUM: CPT

## 2018-06-17 PROCEDURE — 93005 ELECTROCARDIOGRAM TRACING: CPT

## 2018-06-17 PROCEDURE — 71045 X-RAY EXAM CHEST 1 VIEW: CPT

## 2018-06-17 PROCEDURE — 85025 COMPLETE CBC W/AUTO DIFF WBC: CPT

## 2018-06-17 PROCEDURE — 82550 ASSAY OF CK (CPK): CPT

## 2018-06-17 PROCEDURE — 81001 URINALYSIS AUTO W/SCOPE: CPT

## 2018-06-17 PROCEDURE — 80053 COMPREHEN METABOLIC PANEL: CPT

## 2018-06-17 NOTE — RADRPT
EXAM DATE:  6/17/2018 5:29 PM EDT

AGE/SEX:        58 years / Male



INDICATIONS:  Heart Palpitations and loss of Weight



CLINICAL DATA:  This is the patient's initial encounter. Patient reports that signs and symptoms have
 been present for 1 day and indicates a pain score of 0/10. 

                                                                          

MEDICAL/SURGICAL HISTORY:       None. None.



COMPARISON:      [facility] [place] SINGLE AP, 4/29/2017.  . 





FINDINGS:  

A single AP view of the chest demonstrates the lungs to be symmetrically aerated without evidence of 
mass, infiltrate or effusion.  No evidence of pneumothorax. The cardiomediastinal contours are unrema
rkable.  Osseous structures are intact.  





CONCLUSION: 

The lungs are clear.



Electronically signed by: Kali Simpson MD  6/17/2018 5:30 PM EDT

## 2018-06-17 NOTE — PD
HPI


Chief Complaint:  General Weakness


Time Seen by Provider:  17:06


Travel History


International Travel<30 days:  No


Contact w/Intl Traveler<30days:  No


Traveled to known affect area:  No





History of Present Illness


HPI


58-year-old male [facility] patient with history of PTSD, migraine headaches, presents 

to the ER today for several months history of weight loss of over 47 pounds 

since February, decreased appetite, feeling weak today, dizzy.  He denies any 

fevers, vomiting, chest pains, trouble breathing, or other symptoms.  [facility] 

[facility] but [facility] have not found why he is having issues.





Modifying Factors: None


Associated Signs & Symptoms: General weakness, dizziness, decreased appetite, 

weight loss


Risk Factors: None





PFSH


Past Medical History


Arthritis:  Yes


Anxiety:  Yes


Depression:  Yes


Cerebrovascular Accident:  Yes


Diabetes:  No


Musculoskeletal:  Yes (DEGENERATIVE DISC DISEASE, BULGING DISC)


Integumentary:  Yes (PSORIASIS)


Immunizations Current:  Yes





Past Surgical History


Abdominal Surgery:  Yes (HERNIA REPAIR RIGHT LOWER GROIN)


Eye Surgery:  Yes (left eyelid)


Other Surgery:  Yes (SINUS)





Social History


Alcohol Use:  No (3 years ago)


Tobacco Use:  No


Substance Use:  No





Allergies-Medications


(Allergen,Severity, Reaction):  


Coded Allergies:  


     lomustine (Verified  Allergy, Severe, Rash, 6/17/18)


     penicillin G (Verified  Allergy, Severe, Rash, 6/17/18)


Reported Meds & Prescriptions





Reported Meds & Active Scripts


Active


Ibuprofen 600 Mg Tab 600 Mg PO Q8HR PRN


Reported


Suboxone Sublingual Film (Buprenorphine-Naloxone Sublingual Film) 4-1 Mg Film 1 

Film SL 


     Unique ID number required:


Rizatriptan (Rizatriptan Benzoate) 10 Mg Tab 10 Mg  


Latuda (Lurasidone) 80 Mg Tab 80 Mg PO DAILY


Latuda (Lurasidone) 40 Mg Tab 40 Mg PO BID


Hydroxyzine Pamoate 50 Mg Cap 50 Mg PO TID


Topamax (Topiramate) 25 Mg Tab 200 Mg PO HS


Trazodone (Trazodone HCl) 100 Mg Tablet 200 Mg PO HS


Melatonin 5 Mg Tab 6 Mg PO HS


Fluoxetine HCl (Fluoxetine HCl (Pmdd)) 20 Mg Tab 60 Mg PO DAILY








Review of Systems


Except as stated in HPI:  all other systems reviewed are Neg





Physical Exam


Narrative


GENERAL: Well-developed, thin middle-age male patient currently in mild 

distress.  Awake and oriented 3.


SKIN: Focused skin assessment warm/dry.


HEAD: Atraumatic. Normocephalic. 


EYES: Pupils equal and round. No scleral icterus. No injection or drainage. 


ENT: No nasal bleeding or discharge.  Mucous membranes pink and moist.


NECK: Trachea midline. No JVD. 


CARDIOVASCULAR: Regular rate and rhythm.  No murmur appreciated.


RESPIRATORY: No accessory muscle use. Clear to auscultation. Breath sounds 

equal bilaterally. 


GASTROINTESTINAL: Abdomen soft, non-tender, nondistended. Hepatic and splenic 

margins not palpable. 


MUSCULOSKELETAL: No obvious deformities. No clubbing.  No cyanosis.  No edema. 


NEUROLOGICAL: Awake and alert. No obvious cranial nerve deficits.  Motor 

grossly within normal limits. Normal speech.


PSYCHIATRIC: Appropriate mood and affect; insight and judgment normal.





Data


Data


Last Documented VS





Vital Signs








  Date Time  Temp Pulse Resp B/P (MAP) Pulse Ox O2 Delivery O2 Flow Rate FiO2


 


6/17/18 17:10   18  99 Room Air  


 


6/17/18 15:56 97.1 106  133/86 (102)    








Orders





 Orders


Electrocardiogram (6/17/18 17:06)


Complete Blood Count With Diff (6/17/18 17:06)


Comprehensive Metabolic Panel (6/17/18 17:06)


Magnesium (Mg) (6/17/18 17:06)


Ckmb (Isoenzyme) Profile (6/17/18 17:06)


Troponin I (6/17/18 17:06)


Urinalysis - C+S If Indicated (6/17/18 17:06)


Chest, Single Ap (6/17/18 17:06)


Ct Brain W/O Iv Contrast(Rout) (6/17/18 17:06)


Ecg Monitoring (6/17/18 17:06)


Iv Access Insert/Monitor (6/17/18 17:06)


Oximetry (6/17/18 17:06)


Sodium Chloride 0.9% Flush (Ns Flush) (6/17/18 17:15)


Ed Discharge Order (6/17/18 19:33)





Labs





Laboratory Tests








Test


  6/17/18


17:10 6/17/18


18:50


 


White Blood Count 9.5 TH/MM3  


 


Red Blood Count 5.07 MIL/MM3  


 


Hemoglobin 15.6 GM/DL  


 


Hematocrit 43.4 %  


 


Mean Corpuscular Volume 85.7 FL  


 


Mean Corpuscular Hemoglobin 30.7 PG  


 


Mean Corpuscular Hemoglobin


Concent 35.8 % 


  


 


 


Red Cell Distribution Width 12.9 %  


 


Platelet Count 234 TH/MM3  


 


Mean Platelet Volume 6.6 FL  


 


Neutrophils (%) (Auto) 69.6 %  


 


Lymphocytes (%) (Auto) 21.1 %  


 


Monocytes (%) (Auto) 8.4 %  


 


Eosinophils (%) (Auto) 0.3 %  


 


Basophils (%) (Auto) 0.6 %  


 


Neutrophils # (Auto) 6.6 TH/MM3  


 


Lymphocytes # (Auto) 2.0 TH/MM3  


 


Monocytes # (Auto) 0.8 TH/MM3  


 


Eosinophils # (Auto) 0.0 TH/MM3  


 


Basophils # (Auto) 0.1 TH/MM3  


 


CBC Comment AUTO DIFF  


 


Differential Comment


  AUTO DIFF


CONFIRMED 


 


 


Blood Urea Nitrogen 22 MG/DL  


 


Creatinine 0.85 MG/DL  


 


Random Glucose 126 MG/DL  


 


Total Protein 8.4 GM/DL  


 


Albumin 4.1 GM/DL  


 


Calcium Level 9.2 MG/DL  


 


Magnesium Level 2.1 MG/DL  


 


Alkaline Phosphatase 63 U/L  


 


Aspartate Amino Transf


(AST/SGOT) 9 U/L 


  


 


 


Alanine Aminotransferase


(ALT/SGPT) 12 U/L 


  


 


 


Total Bilirubin 1.0 MG/DL  


 


Sodium Level 142 MEQ/L  


 


Potassium Level 3.4 MEQ/L  


 


Chloride Level 106 MEQ/L  


 


Carbon Dioxide Level 24.4 MEQ/L  


 


Anion Gap 12 MEQ/L  


 


Estimat Glomerular Filtration


Rate 93 ML/MIN 


  


 


 


Total Creatine Kinase 46 U/L  


 


Troponin I


  LESS THAN 0.02


NG/ML 


 


 


Urine Color  YELLOW 


 


Urine Turbidity  CLEAR 


 


Urine pH  6.0 


 


Urine Specific Gravity  1.010 


 


Urine Protein  NEG mg/dL 


 


Urine Glucose (UA)  NEG mg/dL 


 


Urine Ketones  NEG mg/dL 


 


Urine Occult Blood  SMALL 


 


Urine Nitrite  NEG 


 


Urine Bilirubin  NEG 


 


Urine Urobilinogen


  


  4.0 OR GREATER


mg/dL


 


Urine Leukocyte Esterase  NEG 


 


Urine RBC  2 /hpf 


 


Urine WBC  4 /hpf 


 


Urine Hyaline Casts  3 /lpf 


 


Urine Mucus  MOD /lpf 


 


Microscopic Urinalysis Comment


  


  CULT NOT


INDICATED











MDM


Medical Decision Making


Medical Screen Exam Complete:  Yes


Emergency Medical Condition:  Yes


Medical Record Reviewed:  Yes


Interpretation(s)





Laboratory Tests








Test


  6/17/18


17:10 6/17/18


18:50


 


Mean Platelet Volume


  6.6 FL


(7.0-11.0) 


 


 


Monocytes (%) (Auto)


  8.4 %


(0.0-8.0) 


 


 


Blood Urea Nitrogen


  22 MG/DL


(7-18) 


 


 


Random Glucose


  126 MG/DL


() 


 


 


Total Protein


  8.4 GM/DL


(6.4-8.2) 


 


 


Aspartate Amino Transf


(AST/SGOT) 9 U/L (15-37) 


  


 


 


Potassium Level


  3.4 MEQ/L


(3.5-5.1) 


 


 


Troponin I


  LESS THAN 0.02


NG/ML 


 


 


Urine Occult Blood  SMALL (NEG) 


 


Urine Urobilinogen


  


  4.0 OR GREATER


mg/dL (LESS


 


Urine Mucus  MOD /lpf (OCC) 








Last 24 hours Impressions








Head CT 6/17/18 1706 Signed





Impressions: 





 CONCLUSION:





 1.  No acute intracranial abnormalities.





  





 


 


Chest X-Ray 6/17/18 1706 Signed





Impressions: 





 CONCLUSION: 





 The lungs are clear.





  





 








Differential Diagnosis


Dehydration versus orthostasis versus electrolyte abnormalities versus 

dysrhythmias versus sepsis versus acute intracranial processes


Narrative Course


Lab work, x-rays, all are fairly unremarkable.  Vital signs are stable in the 

ER.  At this point, I do not see any obvious acute issues and my plan would be 

to release him with follow-up to primary care doctor.  Return for worsening in 

symptoms as necessary.  The plan has been discussed with him and he states 

understanding.





Diagnosis





 Primary Impression:  


 Dehydration


Disposition:  01 DISCHARGE HOME


Condition:  Stable











Candice Levi MD Jun 17, 2018 17:33

## 2018-06-17 NOTE — RADRPT
EXAM DATE:  6/17/2018 5:46 PM EDT

AGE/SEX:        58 years / Male



INDICATIONS:  Dizziness; patient also complains of significant weight loss and loss of appetite.



CLINICAL DATA:  This is the patient's initial encounter. Patient reports that signs and symptoms have
 been present for 2 months and indicates a pain score of 0/10. 

                                                                          

MEDICAL/SURGICAL HISTORY:   Cerebrovascular disease. . Hernia repair



RADIATION DOSE:  39.96 CTDI (mGy)







COMPARISON:      [facility] [place], [place] BRAIN W/O CONTRAST, 1/30/2018.  . 







TECHNIQUE:  CT of the head without contrast.  Using automated exposure control and adjustment of the 
mA and/or kV according to patient size, radiation dose was kept as low as reasonably achievable to ob
tain optimal diagnostic quality images.



FINDINGS: 

Cerebrum:  The ventricles are normal for age.  No evidence of midline shift, mass lesion, hemorrhage 
or acute infarction.  No extraaxial fluid collections are seen.

Posterior Fossa:  The cerebellum and brainstem are intact.  The 4th ventricle is midline.  The cerebe
llopontine angle is unremarkable.

Extracranial:  The visualized portion of the orbits is intact.

Skull:  The calvaria is intact.  No evidence of skull fracture.



CONCLUSION:

1.  No acute intracranial abnormalities.



Electronically signed by: Raji Payan MD  6/17/2018 5:51 PM EDT

## 2018-06-18 NOTE — EKG
Date Performed: 06/17/2018       Time Performed: 17:29:22

 

PTAGE:      58 years

 

EKG:      Sinus rhythm 

 

 WITH OCCASIONAL VENTRICULAR PREMATURE COMPLEXES BORDERLINE ECG INTERPRETATION BASED ON A DEFAULT AGE
 OF 40 YEARS 

 

 PREVIOUS TRACING            : 04/29/2017 16.30

 

DOCTOR:   Holly Langley  Interpretating Date/Time  06/18/2018 22:59:23

## 2022-02-19 NOTE — EKG
Date Performed: 04/29/2017       Time Performed: 16:30:28

 

PTAGE:      57 years

 

EKG:      Sinus rhythm 

 

 NONSPECIFIC T-WAVE ABNORMALITY BORDERLINE ECG 

 

NO PREVIOUS TRACING            

 

DOCTOR:   Thiago Hodges  Interpretating Date/Time  04/30/2017 22:43:17
Results reviewed  Lashae Brock MD
Universal Safety Interventions